# Patient Record
Sex: MALE | Race: WHITE | Employment: FULL TIME | URBAN - METROPOLITAN AREA
[De-identification: names, ages, dates, MRNs, and addresses within clinical notes are randomized per-mention and may not be internally consistent; named-entity substitution may affect disease eponyms.]

---

## 2020-09-02 ENCOUNTER — HOSPITAL ENCOUNTER (INPATIENT)
Age: 35
LOS: 1 days | Discharge: HOME | DRG: 683 | End: 2020-09-03
Attending: INTERNAL MEDICINE | Admitting: INTERNAL MEDICINE
Payer: COMMERCIAL

## 2020-09-02 ENCOUNTER — APPOINTMENT (OUTPATIENT)
Dept: CT IMAGING | Age: 35
DRG: 683 | End: 2020-09-02
Payer: COMMERCIAL

## 2020-09-02 ENCOUNTER — APPOINTMENT (OUTPATIENT)
Dept: GENERAL RADIOLOGY | Age: 35
DRG: 683 | End: 2020-09-02
Payer: COMMERCIAL

## 2020-09-02 PROBLEM — N17.9 AKI (ACUTE KIDNEY INJURY) (HCC): Status: ACTIVE | Noted: 2020-09-02

## 2020-09-02 LAB
ALBUMIN SERPL-MCNC: 5.5 GM/DL (ref 3.4–5)
ALCOHOL SCREEN SERUM: <0.01 %WT/VOL
ALP BLD-CCNC: 116 IU/L (ref 40–128)
ALT SERPL-CCNC: 29 U/L (ref 10–40)
AMPHETAMINES: ABNORMAL
ANION GAP SERPL CALCULATED.3IONS-SCNC: 25 MMOL/L (ref 4–16)
AST SERPL-CCNC: 42 IU/L (ref 15–37)
BACTERIA: NEGATIVE /HPF
BARBITURATE SCREEN URINE: NEGATIVE
BASOPHILS ABSOLUTE: 0.1 K/CU MM
BASOPHILS RELATIVE PERCENT: 0.4 % (ref 0–1)
BENZODIAZEPINE SCREEN, URINE: ABNORMAL
BILIRUB SERPL-MCNC: 1.5 MG/DL (ref 0–1)
BILIRUBIN URINE: ABNORMAL MG/DL
BLOOD, URINE: ABNORMAL
BUN BLDV-MCNC: 18 MG/DL (ref 6–23)
CALCIUM SERPL-MCNC: 11.2 MG/DL (ref 8.3–10.6)
CANNABINOID SCREEN URINE: NEGATIVE
CHLORIDE BLD-SCNC: 89 MMOL/L (ref 99–110)
CLARITY: ABNORMAL
CO2: 19 MMOL/L (ref 21–32)
COCAINE METABOLITE: NEGATIVE
COLOR: ABNORMAL
CREAT SERPL-MCNC: 2.5 MG/DL (ref 0.9–1.3)
DIFFERENTIAL TYPE: ABNORMAL
EOSINOPHILS ABSOLUTE: 0 K/CU MM
EOSINOPHILS RELATIVE PERCENT: 0.1 % (ref 0–3)
GFR AFRICAN AMERICAN: 36 ML/MIN/1.73M2
GFR NON-AFRICAN AMERICAN: 30 ML/MIN/1.73M2
GLUCOSE BLD-MCNC: 102 MG/DL (ref 70–99)
GLUCOSE, URINE: NEGATIVE MG/DL
HCT VFR BLD CALC: 51.3 % (ref 42–52)
HEMOGLOBIN: 17.8 GM/DL (ref 13.5–18)
HYALINE CASTS: >20 /LPF
ICTOTEST: NEGATIVE
IMMATURE NEUTROPHIL %: 0.4 % (ref 0–0.43)
KETONES, URINE: ABNORMAL MG/DL
LACTATE: 1.3 MMOL/L (ref 0.4–2)
LEUKOCYTE ESTERASE, URINE: ABNORMAL
LYMPHOCYTES ABSOLUTE: 1.4 K/CU MM
LYMPHOCYTES RELATIVE PERCENT: 8.4 % (ref 24–44)
MAGNESIUM: 2.1 MG/DL (ref 1.8–2.4)
MCH RBC QN AUTO: 32.7 PG (ref 27–31)
MCHC RBC AUTO-ENTMCNC: 34.7 % (ref 32–36)
MCV RBC AUTO: 94.3 FL (ref 78–100)
MONOCYTES ABSOLUTE: 1.4 K/CU MM
MONOCYTES RELATIVE PERCENT: 8.6 % (ref 0–4)
MUCUS: ABNORMAL HPF
NITRITE URINE, QUANTITATIVE: NEGATIVE
NUCLEATED RBC %: 0 %
OPIATES, URINE: NEGATIVE
OXYCODONE: NEGATIVE
PDW BLD-RTO: 13.8 % (ref 11.7–14.9)
PH, URINE: 5 (ref 5–8)
PHENCYCLIDINE, URINE: NEGATIVE
PLATELET # BLD: 254 K/CU MM (ref 140–440)
PMV BLD AUTO: 10.3 FL (ref 7.5–11.1)
POTASSIUM SERPL-SCNC: 4.5 MMOL/L (ref 3.5–5.1)
PROTEIN UA: 100 MG/DL
RBC # BLD: 5.44 M/CU MM (ref 4.6–6.2)
RBC URINE: 33 /HPF (ref 0–3)
SEGMENTED NEUTROPHILS ABSOLUTE COUNT: 13.4 K/CU MM
SEGMENTED NEUTROPHILS RELATIVE PERCENT: 82.1 % (ref 36–66)
SODIUM BLD-SCNC: 133 MMOL/L (ref 135–145)
SPECIFIC GRAVITY UA: 1.03 (ref 1–1.03)
TOTAL CK: 850 IU/L (ref 38–174)
TOTAL IMMATURE NEUTOROPHIL: 0.06 K/CU MM
TOTAL NUCLEATED RBC: 0 K/CU MM
TOTAL PROTEIN: 9.6 GM/DL (ref 6.4–8.2)
TRICHOMONAS: ABNORMAL /HPF
UROBILINOGEN, URINE: 1 MG/DL (ref 0.2–1)
WBC # BLD: 16.3 K/CU MM (ref 4–10.5)
WBC UA: 4 /HPF (ref 0–2)

## 2020-09-02 PROCEDURE — 6360000002 HC RX W HCPCS: Performed by: PHYSICIAN ASSISTANT

## 2020-09-02 PROCEDURE — 81001 URINALYSIS AUTO W/SCOPE: CPT

## 2020-09-02 PROCEDURE — G0480 DRUG TEST DEF 1-7 CLASSES: HCPCS

## 2020-09-02 PROCEDURE — G0378 HOSPITAL OBSERVATION PER HR: HCPCS

## 2020-09-02 PROCEDURE — 80307 DRUG TEST PRSMV CHEM ANLYZR: CPT

## 2020-09-02 PROCEDURE — 2580000003 HC RX 258: Performed by: PHYSICIAN ASSISTANT

## 2020-09-02 PROCEDURE — 83735 ASSAY OF MAGNESIUM: CPT

## 2020-09-02 PROCEDURE — 74176 CT ABD & PELVIS W/O CONTRAST: CPT

## 2020-09-02 PROCEDURE — 80053 COMPREHEN METABOLIC PANEL: CPT

## 2020-09-02 PROCEDURE — 96361 HYDRATE IV INFUSION ADD-ON: CPT

## 2020-09-02 PROCEDURE — 71046 X-RAY EXAM CHEST 2 VIEWS: CPT

## 2020-09-02 PROCEDURE — 96374 THER/PROPH/DIAG INJ IV PUSH: CPT

## 2020-09-02 PROCEDURE — 93005 ELECTROCARDIOGRAM TRACING: CPT | Performed by: EMERGENCY MEDICINE

## 2020-09-02 PROCEDURE — 85025 COMPLETE CBC W/AUTO DIFF WBC: CPT

## 2020-09-02 PROCEDURE — 99285 EMERGENCY DEPT VISIT HI MDM: CPT

## 2020-09-02 PROCEDURE — 83605 ASSAY OF LACTIC ACID: CPT

## 2020-09-02 PROCEDURE — 2580000003 HC RX 258: Performed by: EMERGENCY MEDICINE

## 2020-09-02 PROCEDURE — 1200000000 HC SEMI PRIVATE

## 2020-09-02 PROCEDURE — 36415 COLL VENOUS BLD VENIPUNCTURE: CPT

## 2020-09-02 PROCEDURE — 82550 ASSAY OF CK (CPK): CPT

## 2020-09-02 PROCEDURE — 96375 TX/PRO/DX INJ NEW DRUG ADDON: CPT

## 2020-09-02 RX ORDER — DIAZEPAM 5 MG/1
5 TABLET ORAL EVERY 12 HOURS PRN
Status: DISCONTINUED | OUTPATIENT
Start: 2020-09-02 | End: 2020-09-03 | Stop reason: HOSPADM

## 2020-09-02 RX ORDER — SODIUM CHLORIDE 0.9 % (FLUSH) 0.9 %
10 SYRINGE (ML) INJECTION PRN
Status: DISCONTINUED | OUTPATIENT
Start: 2020-09-02 | End: 2020-09-03 | Stop reason: HOSPADM

## 2020-09-02 RX ORDER — DEXTROAMPHETAMINE SACCHARATE, AMPHETAMINE ASPARTATE, DEXTROAMPHETAMINE SULFATE AND AMPHETAMINE SULFATE 7.5; 7.5; 7.5; 7.5 MG/1; MG/1; MG/1; MG/1
30 TABLET ORAL 2 TIMES DAILY
COMMUNITY

## 2020-09-02 RX ORDER — DIAZEPAM 10 MG/1
10 TABLET ORAL 2 TIMES DAILY PRN
COMMUNITY

## 2020-09-02 RX ORDER — SODIUM CHLORIDE, SODIUM LACTATE, POTASSIUM CHLORIDE, CALCIUM CHLORIDE 600; 310; 30; 20 MG/100ML; MG/100ML; MG/100ML; MG/100ML
INJECTION, SOLUTION INTRAVENOUS CONTINUOUS
Status: DISCONTINUED | OUTPATIENT
Start: 2020-09-02 | End: 2020-09-03 | Stop reason: HOSPADM

## 2020-09-02 RX ORDER — PROMETHAZINE HYDROCHLORIDE 25 MG/1
12.5 TABLET ORAL EVERY 6 HOURS PRN
Status: DISCONTINUED | OUTPATIENT
Start: 2020-09-02 | End: 2020-09-03 | Stop reason: HOSPADM

## 2020-09-02 RX ORDER — HEPARIN SODIUM 5000 [USP'U]/ML
5000 INJECTION, SOLUTION INTRAVENOUS; SUBCUTANEOUS EVERY 8 HOURS SCHEDULED
Status: DISCONTINUED | OUTPATIENT
Start: 2020-09-03 | End: 2020-09-03 | Stop reason: HOSPADM

## 2020-09-02 RX ORDER — MORPHINE SULFATE 4 MG/ML
4 INJECTION, SOLUTION INTRAMUSCULAR; INTRAVENOUS
Status: DISCONTINUED | OUTPATIENT
Start: 2020-09-02 | End: 2020-09-02

## 2020-09-02 RX ORDER — KETOROLAC TROMETHAMINE 30 MG/ML
30 INJECTION, SOLUTION INTRAMUSCULAR; INTRAVENOUS ONCE
Status: COMPLETED | OUTPATIENT
Start: 2020-09-02 | End: 2020-09-02

## 2020-09-02 RX ORDER — SODIUM CHLORIDE 0.9 % (FLUSH) 0.9 %
10 SYRINGE (ML) INJECTION EVERY 12 HOURS SCHEDULED
Status: DISCONTINUED | OUTPATIENT
Start: 2020-09-02 | End: 2020-09-03 | Stop reason: HOSPADM

## 2020-09-02 RX ORDER — ACETAMINOPHEN 650 MG/1
650 SUPPOSITORY RECTAL EVERY 6 HOURS PRN
Status: DISCONTINUED | OUTPATIENT
Start: 2020-09-02 | End: 2020-09-03 | Stop reason: HOSPADM

## 2020-09-02 RX ORDER — ONDANSETRON 2 MG/ML
4 INJECTION INTRAMUSCULAR; INTRAVENOUS ONCE
Status: COMPLETED | OUTPATIENT
Start: 2020-09-02 | End: 2020-09-02

## 2020-09-02 RX ORDER — ACETAMINOPHEN 325 MG/1
650 TABLET ORAL EVERY 6 HOURS PRN
Status: DISCONTINUED | OUTPATIENT
Start: 2020-09-02 | End: 2020-09-03 | Stop reason: HOSPADM

## 2020-09-02 RX ORDER — POLYETHYLENE GLYCOL 3350 17 G/17G
17 POWDER, FOR SOLUTION ORAL DAILY PRN
Status: DISCONTINUED | OUTPATIENT
Start: 2020-09-02 | End: 2020-09-03 | Stop reason: HOSPADM

## 2020-09-02 RX ORDER — ONDANSETRON 2 MG/ML
4 INJECTION INTRAMUSCULAR; INTRAVENOUS EVERY 6 HOURS PRN
Status: DISCONTINUED | OUTPATIENT
Start: 2020-09-02 | End: 2020-09-03 | Stop reason: HOSPADM

## 2020-09-02 RX ORDER — DEXTROAMPHETAMINE SACCHARATE, AMPHETAMINE ASPARTATE, DEXTROAMPHETAMINE SULFATE AND AMPHETAMINE SULFATE 7.5; 7.5; 7.5; 7.5 MG/1; MG/1; MG/1; MG/1
30 TABLET ORAL 2 TIMES DAILY
Status: DISCONTINUED | OUTPATIENT
Start: 2020-09-03 | End: 2020-09-03 | Stop reason: HOSPADM

## 2020-09-02 RX ORDER — 0.9 % SODIUM CHLORIDE 0.9 %
1000 INTRAVENOUS SOLUTION INTRAVENOUS ONCE
Status: COMPLETED | OUTPATIENT
Start: 2020-09-02 | End: 2020-09-02

## 2020-09-02 RX ADMIN — ONDANSETRON 4 MG: 2 INJECTION INTRAMUSCULAR; INTRAVENOUS at 19:46

## 2020-09-02 RX ADMIN — SODIUM CHLORIDE 1000 ML: 9 INJECTION, SOLUTION INTRAVENOUS at 19:21

## 2020-09-02 RX ADMIN — SODIUM CHLORIDE 1000 ML: 9 INJECTION, SOLUTION INTRAVENOUS at 21:31

## 2020-09-02 RX ADMIN — KETOROLAC TROMETHAMINE 30 MG: 30 INJECTION, SOLUTION INTRAMUSCULAR at 19:46

## 2020-09-02 ASSESSMENT — PAIN SCALES - GENERAL
PAINLEVEL_OUTOF10: 9
PAINLEVEL_OUTOF10: 9

## 2020-09-02 ASSESSMENT — PAIN DESCRIPTION - ORIENTATION: ORIENTATION: RIGHT

## 2020-09-02 ASSESSMENT — PAIN DESCRIPTION - PAIN TYPE: TYPE: ACUTE PAIN

## 2020-09-02 ASSESSMENT — PAIN DESCRIPTION - LOCATION: LOCATION: ABDOMEN

## 2020-09-02 NOTE — ED PROVIDER NOTES
Emergency [address] EMERGENCY DEPARTMENT    Patient: Enoch Gonzalez  MRN: [id number]  : 1985  Date of Evaluation: 2020  ED Provider: Beryle Piper, PA-C    Chief Complaint       Chief Complaint   Patient presents with    Abdominal Pain    Chest Pain    Shortness of Breath       VICTORINO Gonzalez is a 37 y.o. male who presents to the emergency department for abdominal pain, feeling generally unwell. Patient states he was off work for 4 weeks for [employer]. Patient states he initially had a sore throat and was seen at an Urgent Care. He tested positive for strep throat and had a negative COVID test, but was still required to quarantine for 2 weeks. He states when he was scheduled to return to work, his daughter developed a fever. He states the fever resolved within a day and he then took her back to [place] where her mother lives. When he returned, he was required to quarantine another 2 weeks due to travel to [place]. Patient returned to work yesterday. He states he felt generally unwell after work--mostly run down and had some nausea. 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 He states he started having generalized cramping pain throughout his body--in his calves, arms, fingers. He tried drinking water and Gatorade but the cramping persisted, so he was sent home. On his drive, he had to pull over and had a single episode of vomiting which intensified the abdominal pain. He tried taking a hot shower but that didn't help, so he called his PCP who recommended he come to the ED for evaluation. He denies any known sick contacts. Denies fever, cough, congestion. He does admit to being a daily drinker--usually 4-5 shots/liquor.         ROS     CONSTITUTIONAL:  Denies Alb 5.5 (H) 3.4 - 5.0 GM/DL    Total Protein 9.6 (H) 6.4 - 8.2 GM/DL    Total Bilirubin 1.5 (H) 0.0 - 1.0 MG/DL    ALT 29 10 - 40 U/L    AST 42 (H) 15 - 37 IU/L    Alkaline Phosphatase 116 40 - 128 IU/L    GFR Non-African American 30 (L) >60 mL/min/1.73m2    GFR  36 (L) >60 mL/min/1.73m2    Anion Gap 25 (H) 4 - 16   CK   Result Value Ref Range    Total  (H) 38 - 174 IU/L   Urinalysis with microscopic   Result Value Ref Range    Color, UA JEREMIAH (A) YELLOW    Clarity, UA SLIGHTLY CLOUDY (A) CLEAR    Glucose, Urine NEGATIVE NEGATIVE MG/DL    Bilirubin Urine MODERATE (A) NEGATIVE MG/DL    Ketones, Urine SMALL (A) NEGATIVE MG/DL    Specific Gravity, UA 1.030 1.001 - 1.035    Blood, Urine LARGE (A) NEGATIVE    pH, Urine 5.0 5.0 - 8.0    Protein,  (A) NEGATIVE MG/DL    Urobilinogen, Urine 1 0.2 - 1.0 MG/DL    Nitrite Urine, Quantitative NEGATIVE NEGATIVE    Leukocyte Esterase, Urine TRACE (A) NEGATIVE    RBC, UA 33 (H) 0 - 3 /HPF    WBC, UA 4 (H) 0 - 2 /HPF    Bacteria, UA NEGATIVE NEGATIVE /HPF    Mucus, UA RARE (A) NEGATIVE HPF    Trichomonas, UA NONE SEEN NONE SEEN /HPF    Hyaline Casts, UA >20 /LPF   Ethanol Level   Result Value Ref Range    Alcohol Scrn <0.01 <0.01 %WT/VOL   Magnesium   Result Value Ref Range    Magnesium 2.1 1.8 - 2.4 mg/dl   ICTOTEST, URINE   Result Value Ref Range    Ictotest NEGATIVE    Urine Drug Screen   Result Value Ref Range    Cannabinoid Scrn, Ur NEGATIVE NEGATIVE    Amphetamines UNCONFIRMED POSITIVE (A) NEGATIVE    Cocaine Metabolite NEGATIVE NEGATIVE    Benzodiazepine Screen, Urine UNCONFIRMED POSITIVE (A) NEGATIVE    Barbiturate Screen, Ur NEGATIVE NEGATIVE    Opiates, Urine NEGATIVE NEGATIVE    Phencyclidine, Urine NEGATIVE NEGATIVE    Oxycodone NEGATIVE NEGATIVE   Lactic Acid, Plasma   Result Value Ref Range    Lactate 1.3 0.4 - 2.0 mMOL/L       Radiographs:  Ct Abdomen Pelvis Wo Contrast Additional Contrast? None    Result Date: 9/2/2020  EXAMINATION: CT OF THE ABDOMEN AND PELVIS WITHOUT CONTRAST 9/2/2020 8:02 pm TECHNIQUE: CT of the abdomen and pelvis was performed without the administration of intravenous contrast. Multiplanar reformatted images are provided for review. Dose modulation, iterative reconstruction, and/or weight based adjustment of the mA/kV was utilized to reduce the radiation dose to as low as reasonably achievable. COMPARISON: None. HISTORY: ORDERING SYSTEM PROVIDED HISTORY: cramping pain TECHNOLOGIST PROVIDED HISTORY: Reason for exam:->cramping pain Additional Contrast?->None Reason for Exam: abd pain Acuity: Acute Type of Exam: Initial Additional signs and symptoms: Gumaro Conley is a 37 y.o. male Odilia Dine to the emergency department stating he has pain and cramping throughout his abdomen. States he was out of work for 4 weeks for [employer]. Tested negative for Covid but states he was not doing much activity. Recently went back to work and states exerting himself. States he has had a lot of muscle cramps and dark urination. Relevant Medical/Surgical History: [id number] FINDINGS: Lower Chest:  Visualized portion of the lower chest demonstrates no acute abnormality. Organs: The liver demonstrates diffuse hypoattenuation compatible hepatic steatosis. Small hypodensity of the right hepatic lobe medially measuring 2 cm (image 38 series 2; image 39 series 3; image 105 series 4). This demonstrates Hounsfield units suggestive of a cyst.  Gallbladder demonstrates no acute CT abnormality. No biliary dilatation. Nonenhanced spleen, pancreas, and bilateral adrenal glands demonstrate no acute abnormality. The nonenhanced kidneys demonstrate bilateral nonobstructing 3 mm nephrolithiasis. No obstructive uropathy identified. No ureteral stone or hydronephrosis. GI/Bowel: No bowel obstruction identified. No abnormal bowel wall thickening is evident. Small bowel is normal in caliber. Pelvis: Bladder and solid pelvic organs demonstrate no acute findings. The bladder is collapsed therefore not well evaluated. Peritoneum/Retroperitoneum: The abdominal aorta is normal in caliber with no evidence for retroperitoneal adenopathy, free fluid, or free air within the abdomen and pelvis. Bones/Soft Tissues: No acute osseous or soft tissue abnormality identified. Postoperative changes of the lumbosacral spine. 1. No acute intra-process identified. 2. Nonobstructing bilateral nephrolithiasis. Hepatic steatosis. Xr Chest (2 Vw)    Result Date: 9/2/2020  EXAMINATION: TWO XRAY VIEWS OF THE CHEST 9/2/2020 6:29 pm COMPARISON: None. HISTORY: ORDERING SYSTEM PROVIDED HISTORY: Chest pain TECHNOLOGIST PROVIDED HISTORY: Reason for exam:->Chest pain Reason for Exam: Chest pain Acuity: Acute Type of Exam: Initial Additional signs and symptoms: na Relevant Medical/Surgical History: na FINDINGS: No infiltrate or consolidation or effusion is identified. The heart size is normal.     No acute abnormality visualized. Procedures/EKG:   Please see Dr. Elizabeth Hicks note for interpretation. ED Course and MDM   -  Patient seen and evaluated in the emergency department. -  Triage and nursing notes reviewed and incorporated. -  Old chart records reviewed and incorporated. -  Supervising physician was Dr. Cassidy Perez. Patient was seen independently. -  Work-up included:  See above  -  ED medications:  NS, Toradol, Zofran  -  Results discussed with patient. Patient has a leukocytosis of 16,300. ARF with Cr 2.5, GFR 30. Total bili is 1.5. Anion gap is 25. . UDS is positive for amphetamines and benzos, he is prescribed Adderrall and Valium per PDMP. I spoke with Dr. Kushal Sommer, hospitalist, who will admit for further management. In light of current events, I did utilize appropriate PPE (including surgical face mask, safety glasses, and gloves, as recommended by the health facility/national standard best practice, during my bedside interactions with the patient.   Patient was also masked throughout ED course. Final Impression      1. Acute renal failure, unspecified acute renal failure type ([facility] [address]    2. Non-traumatic rhabdomyolysis    3. Elevated bilirubin    4. Positive urine drug screen    5.  Leukocytosis, unspecified type          DISPOSITION Admitted 09/02/2020 10:33:17 PM      [facility], [address] [place] [address] Ambrosio Verta Fritter  09/02/20 4333

## 2020-09-03 VITALS
TEMPERATURE: 97.9 F | OXYGEN SATURATION: 97 % | DIASTOLIC BLOOD PRESSURE: 66 MMHG | SYSTOLIC BLOOD PRESSURE: 131 MMHG | HEART RATE: 91 BPM | HEIGHT: 75 IN | RESPIRATION RATE: 18 BRPM | WEIGHT: 193 LBS | BODY MASS INDEX: 24 KG/M2

## 2020-09-03 LAB
ALBUMIN SERPL-MCNC: 4.3 GM/DL (ref 3.4–5)
ANION GAP SERPL CALCULATED.3IONS-SCNC: 12 MMOL/L (ref 4–16)
ANION GAP SERPL CALCULATED.3IONS-SCNC: 13 MMOL/L (ref 4–16)
BASOPHILS ABSOLUTE: 0 K/CU MM
BASOPHILS RELATIVE PERCENT: 0.3 % (ref 0–1)
BUN BLDV-MCNC: 13 MG/DL (ref 6–23)
BUN BLDV-MCNC: 20 MG/DL (ref 6–23)
CALCIUM SERPL-MCNC: 9.4 MG/DL (ref 8.3–10.6)
CALCIUM SERPL-MCNC: 9.6 MG/DL (ref 8.3–10.6)
CHLORIDE BLD-SCNC: 94 MMOL/L (ref 99–110)
CHLORIDE BLD-SCNC: 94 MMOL/L (ref 99–110)
CO2: 24 MMOL/L (ref 21–32)
CO2: 27 MMOL/L (ref 21–32)
CREAT SERPL-MCNC: 1 MG/DL (ref 0.9–1.3)
CREAT SERPL-MCNC: 1.7 MG/DL (ref 0.9–1.3)
DIFFERENTIAL TYPE: ABNORMAL
EOSINOPHILS ABSOLUTE: 0.1 K/CU MM
EOSINOPHILS RELATIVE PERCENT: 1.3 % (ref 0–3)
GFR AFRICAN AMERICAN: 56 ML/MIN/1.73M2
GFR AFRICAN AMERICAN: >60 ML/MIN/1.73M2
GFR NON-AFRICAN AMERICAN: 47 ML/MIN/1.73M2
GFR NON-AFRICAN AMERICAN: >60 ML/MIN/1.73M2
GLUCOSE BLD-MCNC: 114 MG/DL (ref 70–99)
GLUCOSE BLD-MCNC: 99 MG/DL (ref 70–99)
HCT VFR BLD CALC: 44.8 % (ref 42–52)
HEMOGLOBIN: 15.2 GM/DL (ref 13.5–18)
IMMATURE NEUTROPHIL %: 0.3 % (ref 0–0.43)
LYMPHOCYTES ABSOLUTE: 1.8 K/CU MM
LYMPHOCYTES RELATIVE PERCENT: 17.5 % (ref 24–44)
MCH RBC QN AUTO: 32.7 PG (ref 27–31)
MCHC RBC AUTO-ENTMCNC: 33.9 % (ref 32–36)
MCV RBC AUTO: 96.3 FL (ref 78–100)
MONOCYTES ABSOLUTE: 1.5 K/CU MM
MONOCYTES RELATIVE PERCENT: 14.2 % (ref 0–4)
NUCLEATED RBC %: 0 %
PDW BLD-RTO: 13.9 % (ref 11.7–14.9)
PHOSPHORUS: 3.4 MG/DL (ref 2.5–4.9)
PLATELET # BLD: 213 K/CU MM (ref 140–440)
PMV BLD AUTO: 10.3 FL (ref 7.5–11.1)
POTASSIUM SERPL-SCNC: 4 MMOL/L (ref 3.5–5.1)
POTASSIUM SERPL-SCNC: 4.2 MMOL/L (ref 3.5–5.1)
RBC # BLD: 4.65 M/CU MM (ref 4.6–6.2)
SEGMENTED NEUTROPHILS ABSOLUTE COUNT: 6.9 K/CU MM
SEGMENTED NEUTROPHILS RELATIVE PERCENT: 66.4 % (ref 36–66)
SODIUM BLD-SCNC: 131 MMOL/L (ref 135–145)
SODIUM BLD-SCNC: 133 MMOL/L (ref 135–145)
TOTAL CK: 717 IU/L (ref 38–174)
TOTAL IMMATURE NEUTOROPHIL: 0.03 K/CU MM
TOTAL NUCLEATED RBC: 0 K/CU MM
WBC # BLD: 10.3 K/CU MM (ref 4–10.5)

## 2020-09-03 PROCEDURE — 6360000002 HC RX W HCPCS: Performed by: INTERNAL MEDICINE

## 2020-09-03 PROCEDURE — 6370000000 HC RX 637 (ALT 250 FOR IP): Performed by: INTERNAL MEDICINE

## 2020-09-03 PROCEDURE — 82550 ASSAY OF CK (CPK): CPT

## 2020-09-03 PROCEDURE — 6360000002 HC RX W HCPCS: Performed by: HOSPITALIST

## 2020-09-03 PROCEDURE — 85025 COMPLETE CBC W/AUTO DIFF WBC: CPT

## 2020-09-03 PROCEDURE — 80069 RENAL FUNCTION PANEL: CPT

## 2020-09-03 PROCEDURE — G0378 HOSPITAL OBSERVATION PER HR: HCPCS

## 2020-09-03 PROCEDURE — 36415 COLL VENOUS BLD VENIPUNCTURE: CPT

## 2020-09-03 PROCEDURE — 96375 TX/PRO/DX INJ NEW DRUG ADDON: CPT

## 2020-09-03 PROCEDURE — 80048 BASIC METABOLIC PNL TOTAL CA: CPT

## 2020-09-03 PROCEDURE — 2580000003 HC RX 258: Performed by: INTERNAL MEDICINE

## 2020-09-03 PROCEDURE — 94761 N-INVAS EAR/PLS OXIMETRY MLT: CPT

## 2020-09-03 PROCEDURE — 93010 ELECTROCARDIOGRAM REPORT: CPT | Performed by: INTERNAL MEDICINE

## 2020-09-03 RX ADMIN — DIAZEPAM 5 MG: 5 TABLET ORAL at 01:31

## 2020-09-03 RX ADMIN — HYDROMORPHONE HYDROCHLORIDE 0.5 MG: 1 INJECTION, SOLUTION INTRAMUSCULAR; INTRAVENOUS; SUBCUTANEOUS at 00:13

## 2020-09-03 RX ADMIN — HYDROMORPHONE HYDROCHLORIDE 0.5 MG: 1 INJECTION, SOLUTION INTRAMUSCULAR; INTRAVENOUS; SUBCUTANEOUS at 09:28

## 2020-09-03 RX ADMIN — SODIUM CHLORIDE, PRESERVATIVE FREE 10 ML: 5 INJECTION INTRAVENOUS at 00:16

## 2020-09-03 RX ADMIN — DEXTROAMPHETAMINE SACCHARATE, AMPHETAMINE ASPARTATE, DEXTROAMPHETAMINE SULFATE AND AMPHETAMINE SULFATE 30 MG: 7.5; 7.5; 7.5; 7.5 TABLET ORAL at 09:56

## 2020-09-03 RX ADMIN — SODIUM CHLORIDE, POTASSIUM CHLORIDE, SODIUM LACTATE AND CALCIUM CHLORIDE: 600; 310; 30; 20 INJECTION, SOLUTION INTRAVENOUS at 05:08

## 2020-09-03 RX ADMIN — SODIUM CHLORIDE, POTASSIUM CHLORIDE, SODIUM LACTATE AND CALCIUM CHLORIDE: 600; 310; 30; 20 INJECTION, SOLUTION INTRAVENOUS at 00:12

## 2020-09-03 RX ADMIN — SODIUM CHLORIDE, POTASSIUM CHLORIDE, SODIUM LACTATE AND CALCIUM CHLORIDE: 600; 310; 30; 20 INJECTION, SOLUTION INTRAVENOUS at 10:43

## 2020-09-03 RX ADMIN — SODIUM CHLORIDE, PRESERVATIVE FREE 10 ML: 5 INJECTION INTRAVENOUS at 09:28

## 2020-09-03 ASSESSMENT — PAIN SCALES - GENERAL
PAINLEVEL_OUTOF10: 7
PAINLEVEL_OUTOF10: 8

## 2020-09-03 NOTE — PLAN OF CARE
Problem: Pain:  Goal: Pain level will decrease  Description: Pain level will decrease  Outcome: Ongoing  Goal: Control of acute pain  Description: Control of acute pain  Outcome: Ongoing  Goal: Control of chronic pain  Description: Control of chronic pain  Outcome: Ongoing     Problem: Discharge Planning:  Goal: Discharged to appropriate level of care  Description: Discharged to appropriate level of care  Outcome: Ongoing

## 2020-09-03 NOTE — ED NOTES
Report called to [facility], RN for patient going to room 1114. All questions answered.       Abhi Bowens RN  09/02/20 2333

## 2020-09-03 NOTE — ED PROVIDER NOTES
EKG Interpretation    EKG performed on 9/2/2020 at 1801    Interpreted by emergency department physician    Rhythm: sinus tachycardia  Rate: tachycardia and 120-130  Axis: normal  Ectopy: none  Conduction: normal  ST Segments: no acute change  T Waves: no acute change  Q Waves: none    Clinical Impression: Sinus tachycardia. Left ventricular hypertrophy.     Samy Baumann 27, DO  09/02/20 8815

## 2020-09-03 NOTE — DISCHARGE SUMMARY
Discharge Summary    Name:  Joon Reyes /Age/Sex: 1985  (37 y.o. male)   MRN & CSN:  [id number] & [id number] Admission Date/Time: 2020  6:47 PM   Attending:  Hadley Lechuga MD Discharging Physician: Delma Carcamo MD     HPI and Hospital Course:   Joon Reyes is a 37 y.o.  male  who presents with Abdominal Pain; Chest Pain; and Shortness of Breath    HPI- as per H and [address]  1-Muscle cramps reported after exertion at work- reports improvement- less pain today, CK trended down with IVF  2-JORGE with hyponatremia- likely due to hypovolemia- creatinine improved from 2.5 to almost normal this afternoon, had been on IVF  -hypercalcemia improved as well. He called me to the room, wants to be out of the hospital regardless. Given that dehydration signs significantly improved, discharging and recommended continued self-hydration and outpatient follow up with PCP. No prescription given      The patient expressed appropriate understanding of and agreement with the discharge recommendations, medications, and plan. Consults this admission:  IP CONSULT TO HOSPITALIST  IP CONSULT TO PHARMACY    Discharge Instruction:   Follow up appointments:   Primary care physician:  within 2 weeks    Diet:  General/cardiac/ADA/as tolerated  Activity: {discharge activity: as tolerated  Disposition: Discharged to:   [x]Home, []C, []SNF, []Acute Rehab, []Hospice   Condition on discharge: Stable    Discharge Medications:      Refugia Pale   Home Medication Instructions [id number]    Printed on:20 [id number]   Medication Information                      amphetamine-dextroamphetamine (ADDERALL) 30 MG tablet  Take 30 mg by mouth 2 times daily. diazePAM (VALIUM) 10 MG tablet  Take 10 mg by mouth every 12 hours as needed for Anxiety.                  Objective Findings at Discharge:   /66   Pulse 91   Temp 97.9 °F (36.6 °C) (Oral)   Resp 18   Ht 6' 3\" (1.905 m)   Wt 193 lb (87.5 kg)   SpO2 97%   BMI 24.12 kg/m²            PHYSICAL EXAM   GEN Awake male, laying in bed in no apparent distress. Appears given age. EYES Pupils are equally round. No scleral discharge  HENT Atraumatic and symmetric head  NECK No apparent thyromegaly  RESP Symmetric chest movement while on room air. CARDIO/VASC Peripheral pulses equal bilaterally and palpable. No peripheral edema. GI Abdomen is not distended. Rectal exam deferred.  Martines catheter is not present. HEME/LYMPH No petechiae or ecchymoses. MSK Spontaneous movement of BL upper extremities  SKIN Normal coloration, warm, dry. NEURO Cranial nerves appear grossly intact  PSYCH Awake, alert.     BMP/CBC  Recent Labs     09/02/20  1855 09/03/20  0228 09/03/20  1202   * 133* 131*   K 4.5 4.2 4.0   CL 89* 94* 94*   CO2 19* 27 24   BUN 18 20 13   CREATININE 2.5* 1.7* 1.0   WBC 16.3* 10.3  --    HCT 51.3 44.8  --     213  --      SIGNIFICANT IMAGING AND LABS:      Discharge Time of 22 minutes    Electronically signed by Andrei Downing MD on 9/3/2020 at 1:49 PM

## 2020-09-03 NOTE — CONSULTS
Pharmacy was asked to reconcile and update the prescriptions that he receives from his local [facility] pharmacy [phone]  Apparently, he's from [place] and the only 2 rx's that [facility]  here in town has filled are -   Adderal 30mg  tabs BID  And Diazepam 10mg BID  Thanks.   Milon Form PharmD  9/3/2020  10:04 AM
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
## 2020-09-03 NOTE — PROGRESS NOTES
Came in from ED with chief complaints of abdominal pain and SOB. alert and oriented x 4. Ambulatory. No SOB noted at this time. No skin issues noted. Vital signs stable. Rated abdominal pain at 8/10 at this time.

## 2020-09-09 LAB
EKG ATRIAL RATE: 121 BPM
EKG DIAGNOSIS: NORMAL
EKG P AXIS: 67 DEGREES
EKG P-R INTERVAL: 112 MS
EKG Q-T INTERVAL: 280 MS
EKG QRS DURATION: 74 MS
EKG QTC CALCULATION (BAZETT): 397 MS
EKG R AXIS: 61 DEGREES
EKG T AXIS: 66 DEGREES
EKG VENTRICULAR RATE: 121 BPM

## 2020-09-10 ENCOUNTER — APPOINTMENT (OUTPATIENT)
Dept: CT IMAGING | Age: 35
End: 2020-09-10
Payer: COMMERCIAL

## 2020-09-10 ENCOUNTER — HOSPITAL ENCOUNTER (EMERGENCY)
Age: 35
Discharge: HOME | End: 2020-09-10
Attending: EMERGENCY MEDICINE
Payer: COMMERCIAL

## 2020-09-10 VITALS
TEMPERATURE: 98 F | HEART RATE: 97 BPM | SYSTOLIC BLOOD PRESSURE: 151 MMHG | OXYGEN SATURATION: 98 % | DIASTOLIC BLOOD PRESSURE: 61 MMHG | RESPIRATION RATE: 16 BRPM

## 2020-09-10 LAB
ALBUMIN SERPL-MCNC: 4.8 GM/DL (ref 3.4–5)
ALP BLD-CCNC: 90 IU/L (ref 40–129)
ALT SERPL-CCNC: 31 U/L (ref 10–40)
ANION GAP SERPL CALCULATED.3IONS-SCNC: 17 MMOL/L (ref 4–16)
AST SERPL-CCNC: 28 IU/L (ref 15–37)
BACTERIA: NEGATIVE /HPF
BASOPHILS ABSOLUTE: 0.1 K/CU MM
BASOPHILS RELATIVE PERCENT: 0.9 % (ref 0–1)
BILIRUB SERPL-MCNC: 0.8 MG/DL (ref 0–1)
BILIRUBIN URINE: NEGATIVE MG/DL
BLOOD, URINE: NEGATIVE
BUN BLDV-MCNC: 9 MG/DL (ref 6–23)
CALCIUM SERPL-MCNC: 9.4 MG/DL (ref 8.3–10.6)
CHLORIDE BLD-SCNC: 102 MMOL/L (ref 99–110)
CLARITY: CLEAR
CO2: 21 MMOL/L (ref 21–32)
COLOR: YELLOW
CREAT SERPL-MCNC: 0.9 MG/DL (ref 0.9–1.3)
D DIMER: <200 NG/ML(DDU)
DIFFERENTIAL TYPE: ABNORMAL
EOSINOPHILS ABSOLUTE: 0.1 K/CU MM
EOSINOPHILS RELATIVE PERCENT: 1.1 % (ref 0–3)
GFR AFRICAN AMERICAN: >60 ML/MIN/1.73M2
GFR NON-AFRICAN AMERICAN: >60 ML/MIN/1.73M2
GLUCOSE BLD-MCNC: 88 MG/DL (ref 70–99)
GLUCOSE, URINE: NEGATIVE MG/DL
HCT VFR BLD CALC: 48.7 % (ref 42–52)
HEMOGLOBIN: 16.5 GM/DL (ref 13.5–18)
IMMATURE NEUTROPHIL %: 0.9 % (ref 0–0.43)
KETONES, URINE: ABNORMAL MG/DL
LEUKOCYTE ESTERASE, URINE: ABNORMAL
LIPASE: 25 IU/L (ref 13–60)
LYMPHOCYTES ABSOLUTE: 2.3 K/CU MM
LYMPHOCYTES RELATIVE PERCENT: 30.6 % (ref 24–44)
MCH RBC QN AUTO: 33.3 PG (ref 27–31)
MCHC RBC AUTO-ENTMCNC: 33.9 % (ref 32–36)
MCV RBC AUTO: 98.2 FL (ref 78–100)
MONOCYTES ABSOLUTE: 1 K/CU MM
MONOCYTES RELATIVE PERCENT: 12.9 % (ref 0–4)
MUCUS: ABNORMAL HPF
NITRITE URINE, QUANTITATIVE: NEGATIVE
NUCLEATED RBC %: 0 %
PDW BLD-RTO: 13.3 % (ref 11.7–14.9)
PH, URINE: 5 (ref 5–8)
PLATELET # BLD: 257 K/CU MM (ref 140–440)
PMV BLD AUTO: 10.4 FL (ref 7.5–11.1)
POTASSIUM SERPL-SCNC: 3.5 MMOL/L (ref 3.5–5.1)
PROTEIN UA: NEGATIVE MG/DL
RBC # BLD: 4.96 M/CU MM (ref 4.6–6.2)
RBC URINE: 2 /HPF (ref 0–3)
SEGMENTED NEUTROPHILS ABSOLUTE COUNT: 4 K/CU MM
SEGMENTED NEUTROPHILS RELATIVE PERCENT: 53.6 % (ref 36–66)
SODIUM BLD-SCNC: 140 MMOL/L (ref 135–145)
SPECIFIC GRAVITY UA: 1.02 (ref 1–1.03)
SQUAMOUS EPITHELIAL: <1 /HPF
TOTAL CK: 169 IU/L (ref 38–174)
TOTAL IMMATURE NEUTOROPHIL: 0.07 K/CU MM
TOTAL NUCLEATED RBC: 0 K/CU MM
TOTAL PROTEIN: 8.1 GM/DL (ref 6.4–8.2)
TRICHOMONAS: ABNORMAL /HPF
UROBILINOGEN, URINE: NORMAL MG/DL (ref 0.2–1)
WBC # BLD: 7.5 K/CU MM (ref 4–10.5)
WBC UA: 4 /HPF (ref 0–2)

## 2020-09-10 PROCEDURE — 85025 COMPLETE CBC W/AUTO DIFF WBC: CPT

## 2020-09-10 PROCEDURE — 83690 ASSAY OF LIPASE: CPT

## 2020-09-10 PROCEDURE — 96374 THER/PROPH/DIAG INJ IV PUSH: CPT

## 2020-09-10 PROCEDURE — 74177 CT ABD & PELVIS W/CONTRAST: CPT

## 2020-09-10 PROCEDURE — 81001 URINALYSIS AUTO W/SCOPE: CPT

## 2020-09-10 PROCEDURE — 99284 EMERGENCY DEPT VISIT MOD MDM: CPT

## 2020-09-10 PROCEDURE — 82550 ASSAY OF CK (CPK): CPT

## 2020-09-10 PROCEDURE — 2580000003 HC RX 258: Performed by: PHYSICIAN ASSISTANT

## 2020-09-10 PROCEDURE — 36415 COLL VENOUS BLD VENIPUNCTURE: CPT

## 2020-09-10 PROCEDURE — 6360000002 HC RX W HCPCS: Performed by: PHYSICIAN ASSISTANT

## 2020-09-10 PROCEDURE — 85379 FIBRIN DEGRADATION QUANT: CPT

## 2020-09-10 PROCEDURE — 6370000000 HC RX 637 (ALT 250 FOR IP): Performed by: PHYSICIAN ASSISTANT

## 2020-09-10 PROCEDURE — 6360000004 HC RX CONTRAST MEDICATION: Performed by: PHYSICIAN ASSISTANT

## 2020-09-10 PROCEDURE — 80053 COMPREHEN METABOLIC PANEL: CPT

## 2020-09-10 PROCEDURE — 87086 URINE CULTURE/COLONY COUNT: CPT

## 2020-09-10 RX ORDER — ONDANSETRON 2 MG/ML
4 INJECTION INTRAMUSCULAR; INTRAVENOUS ONCE
Status: COMPLETED | OUTPATIENT
Start: 2020-09-10 | End: 2020-09-10

## 2020-09-10 RX ORDER — ACETAMINOPHEN 500 MG
1000 TABLET ORAL ONCE
Status: COMPLETED | OUTPATIENT
Start: 2020-09-10 | End: 2020-09-10

## 2020-09-10 RX ORDER — 0.9 % SODIUM CHLORIDE 0.9 %
1000 INTRAVENOUS SOLUTION INTRAVENOUS ONCE
Status: COMPLETED | OUTPATIENT
Start: 2020-09-10 | End: 2020-09-10

## 2020-09-10 RX ADMIN — ACETAMINOPHEN 1000 MG: 500 TABLET ORAL at 13:03

## 2020-09-10 RX ADMIN — SODIUM CHLORIDE 1000 ML: 9 INJECTION, SOLUTION INTRAVENOUS at 11:26

## 2020-09-10 RX ADMIN — IOPAMIDOL 80 ML: 755 INJECTION, SOLUTION INTRAVENOUS at 14:04

## 2020-09-10 RX ADMIN — ONDANSETRON HYDROCHLORIDE 4 MG: 2 INJECTION INTRAMUSCULAR; INTRAVENOUS at 11:26

## 2020-09-10 ASSESSMENT — PAIN DESCRIPTION - PAIN TYPE: TYPE: ACUTE PAIN

## 2020-09-10 ASSESSMENT — PAIN SCALES - GENERAL
PAINLEVEL_OUTOF10: 10
PAINLEVEL_OUTOF10: 9

## 2020-09-10 ASSESSMENT — PAIN DESCRIPTION - LOCATION: LOCATION: FLANK

## 2020-09-10 ASSESSMENT — PAIN DESCRIPTION - ORIENTATION: ORIENTATION: RIGHT

## 2020-09-10 NOTE — ED PROVIDER NOTES
sounds, No wheezing  Abdomen: Bowel sounds normal, Soft, mild generalized tenderness, no masses. Mild right-sided CVA tenderness. Musculoskeletal: No edema, No cyanosis  Integument:  Warm, Dry  Neurologic:  Alert & oriented , No focal deficits noted. Normal gross motor coordination & motor strength bilateral upper and lower extremities, Sensation intact.   Psychiatric:  Affect normal, Mood normal.       Labs:  Results for orders placed or performed during the hospital encounter of 09/10/20   CBC Auto Differential   Result Value Ref Range    WBC 7.5 4.0 - 10.5 K/CU MM    RBC 4.96 4.6 - 6.2 M/CU MM    Hemoglobin 16.5 13.5 - 18.0 GM/DL    Hematocrit 48.7 42 - 52 %    MCV 98.2 78 - 100 FL    MCH 33.3 (H) 27 - 31 PG    MCHC 33.9 32.0 - 36.0 %    RDW 13.3 11.7 - 14.9 %    Platelets 808 541 - 941 K/CU MM    MPV 10.4 7.5 - 11.1 FL    Differential Type AUTOMATED DIFFERENTIAL     Segs Relative 53.6 36 - 66 %    Lymphocytes % 30.6 24 - 44 %    Monocytes % 12.9 (H) 0 - 4 %    Eosinophils % 1.1 0 - 3 %    Basophils % 0.9 0 - 1 %    Segs Absolute 4.0 K/CU MM    Lymphocytes Absolute 2.3 K/CU MM    Monocytes Absolute 1.0 K/CU MM    Eosinophils Absolute 0.1 K/CU MM    Basophils Absolute 0.1 K/CU MM    Nucleated RBC % 0.0 %    Total Nucleated RBC 0.0 K/CU MM    Total Immature Neutrophil 0.07 K/CU MM    Immature Neutrophil % 0.9 (H) 0 - 0.43 %   CMP   Result Value Ref Range    Sodium 140 135 - 145 MMOL/L    Potassium 3.5 3.5 - 5.1 MMOL/L    Chloride 102 99 - 110 mMol/L    CO2 21 21 - 32 MMOL/L    BUN 9 6 - 23 MG/DL    CREATININE 0.9 0.9 - 1.3 MG/DL    Glucose 88 70 - 99 MG/DL    Calcium 9.4 8.3 - 10.6 MG/DL    Alb 4.8 3.4 - 5.0 GM/DL    Total Protein 8.1 6.4 - 8.2 GM/DL    Total Bilirubin 0.8 0.0 - 1.0 MG/DL    ALT 31 10 - 40 U/L    AST 28 15 - 37 IU/L    Alkaline Phosphatase 90 40 - 129 IU/L    GFR Non-African American >60 >60 mL/min/1.73m2    GFR African American >60 >60 mL/min/1.73m2    Anion Gap 17 (H) 4 - 16   CK   Result Value Ref Range    Total  38 - 174 IU/L   Urinalysis   Result Value Ref Range    Color, UA YELLOW YELLOW    Clarity, UA CLEAR CLEAR    Glucose, Urine NEGATIVE NEGATIVE MG/DL    Bilirubin Urine NEGATIVE NEGATIVE MG/DL    Ketones, Urine SMALL (A) NEGATIVE MG/DL    Specific Gravity, UA 1.019 1.001 - 1.035    Blood, Urine NEGATIVE NEGATIVE    pH, Urine 5.0 5.0 - 8.0    Protein, UA NEGATIVE NEGATIVE MG/DL    Urobilinogen, Urine NORMAL 0.2 - 1.0 MG/DL    Nitrite Urine, Quantitative NEGATIVE NEGATIVE    Leukocyte Esterase, Urine TRACE (A) NEGATIVE    RBC, UA 2 0 - 3 /HPF    WBC, UA 4 (H) 0 - 2 /HPF    Bacteria, UA NEGATIVE NEGATIVE /HPF    Squam Epithel, UA <1 /HPF    Mucus, UA FEW (A) NEGATIVE HPF    Trichomonas, UA NONE SEEN NONE SEEN /HPF   Lipase   Result Value Ref Range    Lipase 25 13 - 60 IU/L   D-dimer, Quantitative   Result Value Ref Range    D-Dimer, Quant <200 <230 NG/mL(DDU)         ED COURSE & MEDICAL DECISION MAKING      Patient presents as above. Patient provided IV fluids, Zofran. Patient provided Tylenol for pain. Urinalysis shows small ketones, trace leukocytes, 4 white blood cells, negative bacteria. Patient denies any urinary symptoms. Urine sent for culture. CBC is within normal limits. CMP shows elevation anion gap of 17 otherwise within normal limits. CK is 169. Lipase 25. D-dimer is less than 200. Patient denies any chest pain, no increased work of breathing on exam and patient's pulse ox is 100%. Patient denies cough. I discussed lab work with patient today. Patient states he does not feel normal, with his main focus being his abdominal pain CT imaging is ordered to further evaluate. CT is pending at end of shift, see supervising physician note for final impression and plan. Clinical  IMPRESSION    1. Pain of upper abdomen        See supervising physician note for final impression and plan.     Comment: Please note this report has been produced using speech recognition software and may contain errors related to that system including errors in grammar, punctuation, and spelling, as well as words and phrases that may be inappropriate. If there are any questions or concerns please feel free to contact the dictating provider for clarification.             Fany Smalls PA-C  09/10/20 1311

## 2020-09-10 NOTE — LETTER
[address] [facility] Emergency Department  [address] [place] [address]  Phone: [phone]  Fax: [phone]             September 10, 2020    Patient: Gumaro Conley   YOB: 1985   Date of Visit: 9/10/2020       To Whom It May Concern:    Gumaro Conley was seen and treated in our emergency department on 9/10/2020. He may return to work on 9/14/2020.       Sincerely,             Signature:__________________________________

## 2020-09-10 NOTE — ED PROVIDER NOTES
EMERGENCY DEPARTMENT ENCOUNTER      I evaluated this patient via a TeleHealth platform as a Physician-in-triage. I performed a medical screening evaluation on the patient remotely via the [employer]. HISTORY OF PRESENT ILLNESS  Enoch Gonzalez is a 37 y.o. male here with flank pain and body cramps. The patient was recently admitted for rhabdomyolysis and acute kidney injury. He left prior to completing treatment and is having the same symptoms as when he was admitted. PHYSICAL EXAM  /71   Pulse 109   Temp 98 °F (36.7 °C) (Oral)   Resp 16   SpO2 98%     On exam, the patient appears well-hydrated, well-nourished, and in no acute distress. Mucous membranes are moist. Speech is clear. Breathing is unlabored. Skin is dry. Mental status is normal. Moves all extremities, and is without facial droop. Comment: Please note this report has been produced using speech recognition software and may contain errors related to that system including errors in grammar, punctuation, and spelling, as well as words and phrases that may be inappropriate. If there are any questions or concerns please feel free to contact the dictating provider for clarification.         Chaparro Rene MD  09/10/20 4117

## 2020-09-10 NOTE — ACP (ADVANCE CARE PLANNING)
Advance Care Planning     Advance Care Planning Activator (Inpatient)  Conversation Note      Date of ACP Conversation: 9/10/2020    Conversation Conducted with: Patient with Decision Making Capacity    ACP Activator: Ravi Villareal    *When Decision Maker makes decisions on behalf of the incapacitated patient: Decision Maker is asked to consider and make decisions based on patient values, known preferences, or best interests. Health Care Decision Maker:     Current Designated Health Care Decision Maker:   Primary Decision Maker: Osito Lozano [facility] - [phone]  (If there is a [address] named in the \"Healthcare Decision Makers\" box in the ACP activity, but it is not visible above, be sure to open that field and then select the health care decision maker relationship (ie \"primary\") in the blank space to the right of the name.) Validate  this information as still accurate & up-to-date; edit [address] field as needed.)    Note: Assess and validate information in current ACP documents, as indicated. Care Preferences    Ventilation: \"If you were in your present state of health and suddenly became very ill and were unable to breathe on your own, what would your preference be about the use of a ventilator (breathing machine) if it were available to you? \"      Would the patient desire the use of ventilator (breathing machine)?: yes    \"If your health worsens and it becomes clear that your chance of recovery is unlikely, what would your preference be about the use of a ventilator (breathing machine) if it were available to you? \"     Would the patient desire the use of ventilator (breathing machine)?: Yes      Resuscitation  \"CPR works best to restart the heart when there is a sudden event, like a heart attack, in someone who is otherwise healthy.  Unfortunately, CPR does not typically restart the heart for people who have serious health conditions or who are very sick.\"    \"In the event your heart stopped as a result of an underlying serious health condition, would you want attempts to be made to restart your heart (answer \"yes\" for attempt to resuscitate) or would you prefer a natural death (answer \"no\" for do not attempt to resuscitate)? \" yes      NOTE: If the patient has a valid advance directive AND now provides care preference(s) that are inconsistent with that prior directive, advise the patient to consider either: creating a new advance directive that complies with state-specific requirements; or, if that is not possible, orally revoking that prior directive in accordance with state-specific requirements, which must be documented in the EHR. [] Yes   [] No   Educated Patient / [place] [facility] regarding differences between Advance Directives and portable DNR orders.     Length of ACP Conversation in minutes:  11  Conversation Outcomes:  [x] ACP discussion completed  [] Existing advance directive reviewed with patient; no changes to patient's previously recorded wishes  [] New Advance Directive completed  [] Portable Do Not Rescitate prepared for Provider review and signature  [] POLST/POST/MOLST/MOST prepared for Provider review and signature      Follow-up plan:    [] Schedule follow-up conversation to continue planning  [] Referred individual to Provider for additional questions/concerns   [] Advised patient/agent/surrogate to review completed ACP document and update if needed with changes in condition, patient preferences or care setting    [] This note routed to one or more involved healthcare providers

## 2020-09-11 ENCOUNTER — CARE COORDINATION (OUTPATIENT)
Dept: CARE COORDINATION | Age: 35
End: 2020-09-11

## 2020-09-11 LAB
CULTURE: NORMAL
Lab: NORMAL
SPECIMEN: NORMAL

## 2020-09-14 ENCOUNTER — OFFICE VISIT (OUTPATIENT)
Dept: FAMILY MEDICINE CLINIC | Age: 35
End: 2020-09-14
Payer: COMMERCIAL

## 2020-09-14 ENCOUNTER — HOSPITAL ENCOUNTER (OUTPATIENT)
Age: 35
Setting detail: SPECIMEN
Discharge: HOME | End: 2020-09-14
Payer: COMMERCIAL

## 2020-09-14 VITALS
HEIGHT: 73 IN | WEIGHT: 196.8 LBS | HEART RATE: 95 BPM | DIASTOLIC BLOOD PRESSURE: 86 MMHG | TEMPERATURE: 98 F | OXYGEN SATURATION: 97 % | SYSTOLIC BLOOD PRESSURE: 116 MMHG | BODY MASS INDEX: 26.08 KG/M2

## 2020-09-14 LAB
ALBUMIN SERPL-MCNC: 4.9 GM/DL (ref 3.4–5)
ALP BLD-CCNC: 81 IU/L (ref 40–128)
ALT SERPL-CCNC: 27 U/L (ref 10–40)
ANION GAP SERPL CALCULATED.3IONS-SCNC: 15 MMOL/L (ref 4–16)
AST SERPL-CCNC: 22 IU/L (ref 15–37)
BASOPHILS ABSOLUTE: 0.1 K/CU MM
BASOPHILS RELATIVE PERCENT: 0.7 % (ref 0–1)
BILIRUB SERPL-MCNC: 0.5 MG/DL (ref 0–1)
BUN BLDV-MCNC: 6 MG/DL (ref 6–23)
CALCIUM SERPL-MCNC: 9.7 MG/DL (ref 8.3–10.6)
CHLORIDE BLD-SCNC: 102 MMOL/L (ref 99–110)
CO2: 25 MMOL/L (ref 21–32)
CREAT SERPL-MCNC: 0.9 MG/DL (ref 0.9–1.3)
DIFFERENTIAL TYPE: ABNORMAL
EOSINOPHILS ABSOLUTE: 0.1 K/CU MM
EOSINOPHILS RELATIVE PERCENT: 1.1 % (ref 0–3)
GFR AFRICAN AMERICAN: >60 ML/MIN/1.73M2
GFR NON-AFRICAN AMERICAN: >60 ML/MIN/1.73M2
GLUCOSE BLD-MCNC: 90 MG/DL (ref 70–99)
HCT VFR BLD CALC: 45.9 % (ref 42–52)
HEMOGLOBIN: 15.2 GM/DL (ref 13.5–18)
IMMATURE NEUTROPHIL %: 0.5 % (ref 0–0.43)
LYMPHOCYTES ABSOLUTE: 2.1 K/CU MM
LYMPHOCYTES RELATIVE PERCENT: 19.9 % (ref 24–44)
MAGNESIUM: 1.9 MG/DL (ref 1.8–2.4)
MCH RBC QN AUTO: 32.2 PG (ref 27–31)
MCHC RBC AUTO-ENTMCNC: 33.1 % (ref 32–36)
MCV RBC AUTO: 97.2 FL (ref 78–100)
MONOCYTES ABSOLUTE: 1 K/CU MM
MONOCYTES RELATIVE PERCENT: 9.5 % (ref 0–4)
NUCLEATED RBC %: 0 %
PDW BLD-RTO: 13.2 % (ref 11.7–14.9)
PLATELET # BLD: 290 K/CU MM (ref 140–440)
PMV BLD AUTO: 11.1 FL (ref 7.5–11.1)
POTASSIUM SERPL-SCNC: 4 MMOL/L (ref 3.5–5.1)
RBC # BLD: 4.72 M/CU MM (ref 4.6–6.2)
SEGMENTED NEUTROPHILS ABSOLUTE COUNT: 7.1 K/CU MM
SEGMENTED NEUTROPHILS RELATIVE PERCENT: 68.3 % (ref 36–66)
SODIUM BLD-SCNC: 142 MMOL/L (ref 135–145)
T4 FREE: 0.85 NG/DL (ref 0.9–1.8)
TOTAL CK: 136 IU/L (ref 38–174)
TOTAL IMMATURE NEUTOROPHIL: 0.05 K/CU MM
TOTAL NUCLEATED RBC: 0 K/CU MM
TOTAL PROTEIN: 7.7 GM/DL (ref 6.4–8.2)
TSH HIGH SENSITIVITY: 0.74 UIU/ML (ref 0.27–4.2)
WBC # BLD: 10.4 K/CU MM (ref 4–10.5)

## 2020-09-14 PROCEDURE — 85025 COMPLETE CBC W/AUTO DIFF WBC: CPT

## 2020-09-14 PROCEDURE — 82550 ASSAY OF CK (CPK): CPT

## 2020-09-14 PROCEDURE — 84439 ASSAY OF FREE THYROXINE: CPT

## 2020-09-14 PROCEDURE — 99202 OFFICE O/P NEW SF 15 MIN: CPT | Performed by: NURSE PRACTITIONER

## 2020-09-14 PROCEDURE — 83735 ASSAY OF MAGNESIUM: CPT

## 2020-09-14 PROCEDURE — 80053 COMPREHEN METABOLIC PANEL: CPT

## 2020-09-14 PROCEDURE — 84443 ASSAY THYROID STIM HORMONE: CPT

## 2020-09-14 ASSESSMENT — PATIENT HEALTH QUESTIONNAIRE - PHQ9
SUM OF ALL RESPONSES TO PHQ9 QUESTIONS 1 & 2: 0
1. LITTLE INTEREST OR PLEASURE IN DOING THINGS: 0
2. FEELING DOWN, DEPRESSED OR HOPELESS: 0
SUM OF ALL RESPONSES TO PHQ QUESTIONS 1-9: 0
SUM OF ALL RESPONSES TO PHQ QUESTIONS 1-9: 0

## 2020-09-14 ASSESSMENT — ENCOUNTER SYMPTOMS
SHORTNESS OF BREATH: 1
COUGH: 0
WHEEZING: 0
CHEST TIGHTNESS: 0

## 2020-09-14 NOTE — LETTER
[address] [facility],2[address] Floor WALK-IN CARE  [address] [place] [address] [address] [facility]  Phone: [phone]  Fax: [phone]    DANIELLE Lindsay CNP        September 14, 2020     Patient: Jenny Lock   YOB: 1985   Date of Visit: 9/14/2020       To Whom it May Concern:    Jenny Lock was seen in my clinic on 9/14/2020. He may return to work on 9/20/2020. If you have any questions or concerns, please don't hesitate to call.     Sincerely,           DANIELLE Lindsay CNP

## 2020-09-14 NOTE — PROGRESS NOTES
Mathew Goff   37 y.o.  male  [id number]      Chief Complaint   Patient presents with   [facility] ED Follow-up     acute kidney failure, not feeling any better, but not worse either    Letter for School/Work     requesting more time off of work        Subjective:  37 y.o.male is here for a follow up. He has the following chronic/acute medical problems:  Patient Active Problem List   Diagnosis    JORGE (acute kidney injury) [facility])       Patient is here for a ER follow up. Patient was admitted to the hospital on 9/2/2020 when found to have acute kidney injury with hyponatremia. This was likely due hypovolemia. His creatine improved from 2.5 to almost normal prior to discharge with IV fluids. The hypercalcemia improved also. Patient's muscle cramps improved while in the hospital with IV fluids. His CK trended down. Patient went back to the emergency room on 9/10/2020 with complaints of flank pain and continue muscle pains. Patient states these symptoms continue since he was discharged from the hospital. CBC is within normal limits. CMP showed elevated anion gap 17 otherwise within normal limits. CK is 169. Lipase 25. D-dimer is less than 200. CY scan of abdomen and pelvis showed no acute intra-abdominal process. Nonobstructing stones in the kidney, sacroiliitis. Patient was told these finding were not related to his symptoms. Patient states he is still having muscle aches and still does not feel well. Patient requesting to go back to work on Sunday to give him a few more days. He states his work weeks is Sunday to Wednesday. Patient is requesting his paperwork be filled out for the time he has been off work. Review of Systems   Constitutional: Positive for appetite change and fatigue. Negative for chills and fever. HENT: Negative. Respiratory: Positive for shortness of breath (states if he exerts self). Negative for cough, chest tightness and wheezing.     Cardiovascular: Negative for chest pain and palpitations. Gastrointestinal: Negative for diarrhea, nausea and vomiting. Musculoskeletal: Positive for myalgias. Skin: Negative for rash. Neurological: Negative for dizziness, light-headedness and headaches. Current Outpatient Medications   Medication Sig Dispense Refill    amphetamine-dextroamphetamine (ADDERALL) 30 MG tablet Take 30 mg by mouth 2 times daily.  diazePAM (VALIUM) 10 MG tablet Take 10 mg by mouth every 12 hours as needed for Anxiety. No current facility-administered medications for this visit. Past medical, family,surgical history reviewed today. Objective:  /86   Pulse 95   Temp 98 °F (36.7 °C) (Infrared)   Ht 6' 1\" (1.854 m)   Wt 196 lb 12.8 oz (89.3 kg)   SpO2 97%   BMI 25.96 kg/m²   BP Readings from Last 3 Encounters:   09/14/20 116/86   09/10/20 (!) 151/61   09/03/20 131/66     Wt Readings from Last 3 Encounters:   09/14/20 196 lb 12.8 oz (89.3 kg)   09/02/20 193 lb (87.5 kg)         Physical Exam  Constitutional:       Appearance: Normal appearance. HENT:      Head: Normocephalic. Neck:      Musculoskeletal: Neck supple. Cardiovascular:      Rate and Rhythm: Normal rate and regular rhythm. Heart sounds: Normal heart sounds. Pulmonary:      Effort: Pulmonary effort is normal.      Breath sounds: Normal breath sounds. Skin:     General: Skin is warm and dry. Neurological:      Mental Status: He is alert and oriented to person, place, and time.    Psychiatric:         Mood and Affect: Mood normal.         Behavior: Behavior normal.         Lab Results   Component Value Date    WBC 10.4 09/14/2020    HGB 15.2 09/14/2020    HCT 45.9 09/14/2020    MCV 97.2 09/14/2020     09/14/2020     Lab Results   Component Value Date     09/14/2020    K 4.0 09/14/2020     09/14/2020    CO2 25 09/14/2020    BUN 6 09/14/2020    CREATININE 0.9 09/14/2020    GLUCOSE 90 09/14/2020    CALCIUM 9.7 09/14/2020    PROT 7.7 09/14/2020 LABALBU 4.9 09/14/2020    BILITOT 0.5 09/14/2020    ALKPHOS 81 09/14/2020    AST 22 09/14/2020    ALT 27 09/14/2020    LABGLOM >60 09/14/2020    GFRAA >60 09/14/2020     No results found for: CHOL  No results found for: TRIG  No results found for: HDL  No results found for: LDLCALC, LDLCHOLESTEROL  No results found for: LABA1C  Lab Results   Component Value Date    TSHHS 0.743 09/14/2020         ASSESSMENT/PLAN:      1. Muscle cramping  Explained to patient will order blood work to make sure he is not having any changes. Advised patient to establish with a PCP. Patient verbalized understanding. Will fill out paper work for short term disability. Ordered a CBC, CK, CMP, magnesium. 2. Thyroid disorder screen  TSH and T4 free. No orders of the defined types were placed in this encounter. There are no discontinued medications. No orders of the defined types were placed in this encounter. Care discussed with patient. Questions answered. Patient verbalizes understanding and agrees with plan. After visit summary provided. Advised to call for any problems, questions, or concerns. Return if symptoms worsen or fail to improve.                                              Signed:  DANIELLE Vogel CNP  09/17/20  2:04 PM

## 2020-09-14 NOTE — CARE COORDINATION
Patient contacted regarding recent discharge and COVID-19 risk. Discussed COVID-19 related testing which was not done at this time. Test results were not done. Patient informed of results, if available? n/a     Care Transition Nurse/ Ambulatory Care Manager contacted the patient by telephone to perform post discharge assessment. Verified name and  with patient as identifiers. Patient has following risk factors of: history of kidney stones. CTN/ACM reviewed discharge instructions, medical action plan and red flags related to discharge diagnosis. Reviewed and educated them on any new and changed medications related to discharge diagnosis. Advised obtaining a 90-day supply of all daily and as-needed medications. Education provided regarding infection prevention, and signs and symptoms of COVID-19 and when to seek medical attention with patient who verbalized understanding. Discussed exposure protocols and quarantine from [address] you at higher risk for severe illness  and given an opportunity for questions and concerns. The patient agrees to contact the COVID-19 hotline [phone] or PCP office for questions related to their healthcare. CTN/ACM provided contact information for future reference. From CDC: Are you at higher risk for severe illness?  Wash your hands often.  Avoid close contact (6 feet, which is about two arm lengths) with people who are sick.  Put distance between yourself and other people if COVID-19 is spreading in your community.  Clean and disinfect frequently touched surfaces.  Avoid all cruise travel and non-essential air travel.  Call your healthcare professional if you have concerns about COVID-19 and your underlying condition or if you are sick. For more information on steps you can take to protect yourself, see CDC's How to [place] for follow-up call in 5-7 days based on severity of symptoms and risk factors.     Pt reports he is still feeling the same as he did in the ED: abdominal pain and body aches. He is trying to stay well-hydrated and rest at this time. He reports he will be calling local PCP offices to establish care as he is from [place], but staying in [place] at this time. [facility] offered to assist pt in calling and scheduling with a new PCP and he stated he would do so once this call ended. He also reports he will call ACM back when he finds a new PCP or if he needs further assistance.

## 2020-09-17 ASSESSMENT — ENCOUNTER SYMPTOMS
NAUSEA: 0
DIARRHEA: 0
VOMITING: 0

## 2020-09-18 ENCOUNTER — CARE COORDINATION (OUTPATIENT)
Dept: CARE COORDINATION | Age: 35
End: 2020-09-18

## 2020-09-18 NOTE — CARE COORDINATION
You Patient resolved from the Care Transitions episode on 9.18.20  Discussed COVID-19 related testing which was not done at this time. Test results were not done. Patient informed of results, if available? n/a    Patient/family has been provided the following resources and education related to COVID-19:                         Signs, symptoms and red flags related to COVID-19            CDC exposure and quarantine guidelines            [facility] exposure contact - [phone]            Contact for their local Department of Health                 Patient currently reports that the following symptoms have improved:  fatigue and no new/worsening symptoms     No further outreach scheduled with this CTN/ACM. Episode of Care resolved. Patient has this CTN/ACM contact information if future needs arise. Pt reports he is feeling better, but still tired/fatigued. He f/u with a walk in clinic through [facility] ([facility] and will continue to look for a new PCP. He had further blood work completed at his f/u appointment. Pt asked ACM if ACM is able to notify his employer when he can return to work. ACM informed pt that typically he would need to obtain a letter from the provider's office that f/u with him. Pt voiced understanding. Pt did not have any other questions/concerns at this time.

## 2020-10-21 ENCOUNTER — APPOINTMENT (OUTPATIENT)
Dept: GENERAL RADIOLOGY | Age: 35
End: 2020-10-21
Payer: COMMERCIAL

## 2020-10-21 ENCOUNTER — HOSPITAL ENCOUNTER (EMERGENCY)
Age: 35
Discharge: HOME | End: 2020-10-21
Attending: FAMILY MEDICINE
Payer: COMMERCIAL

## 2020-10-21 ENCOUNTER — APPOINTMENT (OUTPATIENT)
Dept: CT IMAGING | Age: 35
End: 2020-10-21
Payer: COMMERCIAL

## 2020-10-21 VITALS
RESPIRATION RATE: 17 BRPM | HEART RATE: 103 BPM | TEMPERATURE: 97.9 F | OXYGEN SATURATION: 95 % | DIASTOLIC BLOOD PRESSURE: 95 MMHG | SYSTOLIC BLOOD PRESSURE: 120 MMHG | HEIGHT: 74 IN | WEIGHT: 193 LBS | BODY MASS INDEX: 24.77 KG/M2

## 2020-10-21 LAB
ALBUMIN SERPL-MCNC: 4.5 GM/DL (ref 3.4–5)
ALP BLD-CCNC: 116 IU/L (ref 40–129)
ALT SERPL-CCNC: 68 U/L (ref 10–40)
ANION GAP SERPL CALCULATED.3IONS-SCNC: 19 MMOL/L (ref 4–16)
AST SERPL-CCNC: 96 IU/L (ref 15–37)
BASOPHILS ABSOLUTE: 0.1 K/CU MM
BASOPHILS RELATIVE PERCENT: 1 % (ref 0–1)
BILIRUB SERPL-MCNC: 0.8 MG/DL (ref 0–1)
BUN BLDV-MCNC: 4 MG/DL (ref 6–23)
CALCIUM SERPL-MCNC: 8.8 MG/DL (ref 8.3–10.6)
CHLORIDE BLD-SCNC: 103 MMOL/L (ref 99–110)
CO2: 21 MMOL/L (ref 21–32)
CREAT SERPL-MCNC: 0.9 MG/DL (ref 0.9–1.3)
DIFFERENTIAL TYPE: ABNORMAL
EOSINOPHILS ABSOLUTE: 0.1 K/CU MM
EOSINOPHILS RELATIVE PERCENT: 1 % (ref 0–3)
GFR AFRICAN AMERICAN: >60 ML/MIN/1.73M2
GFR NON-AFRICAN AMERICAN: >60 ML/MIN/1.73M2
GLUCOSE BLD-MCNC: 85 MG/DL (ref 70–99)
HCT VFR BLD CALC: 47.5 % (ref 42–52)
HEMOGLOBIN: 16 GM/DL (ref 13.5–18)
IMMATURE NEUTROPHIL %: 0.6 % (ref 0–0.43)
LYMPHOCYTES ABSOLUTE: 2.1 K/CU MM
LYMPHOCYTES RELATIVE PERCENT: 31.9 % (ref 24–44)
MAGNESIUM: 2 MG/DL (ref 1.8–2.4)
MCH RBC QN AUTO: 33 PG (ref 27–31)
MCHC RBC AUTO-ENTMCNC: 33.7 % (ref 32–36)
MCV RBC AUTO: 97.9 FL (ref 78–100)
MONOCYTES ABSOLUTE: 0.8 K/CU MM
MONOCYTES RELATIVE PERCENT: 12.6 % (ref 0–4)
NUCLEATED RBC %: 0 %
PDW BLD-RTO: 13.2 % (ref 11.7–14.9)
PLATELET # BLD: 221 K/CU MM (ref 140–440)
PMV BLD AUTO: 10.1 FL (ref 7.5–11.1)
POTASSIUM SERPL-SCNC: 3.8 MMOL/L (ref 3.5–5.1)
RBC # BLD: 4.85 M/CU MM (ref 4.6–6.2)
SEGMENTED NEUTROPHILS ABSOLUTE COUNT: 3.5 K/CU MM
SEGMENTED NEUTROPHILS RELATIVE PERCENT: 52.9 % (ref 36–66)
SODIUM BLD-SCNC: 143 MMOL/L (ref 135–145)
TOTAL CK: 146 IU/L (ref 38–174)
TOTAL IMMATURE NEUTOROPHIL: 0.04 K/CU MM
TOTAL NUCLEATED RBC: 0 K/CU MM
TOTAL PROTEIN: 7.7 GM/DL (ref 6.4–8.2)
TROPONIN T: <0.01 NG/ML
WBC # BLD: 6.7 K/CU MM (ref 4–10.5)

## 2020-10-21 PROCEDURE — 99285 EMERGENCY DEPT VISIT HI MDM: CPT

## 2020-10-21 PROCEDURE — 85025 COMPLETE CBC W/AUTO DIFF WBC: CPT

## 2020-10-21 PROCEDURE — 71260 CT THORAX DX C+: CPT

## 2020-10-21 PROCEDURE — 93005 ELECTROCARDIOGRAM TRACING: CPT | Performed by: EMERGENCY MEDICINE

## 2020-10-21 PROCEDURE — 80053 COMPREHEN METABOLIC PANEL: CPT

## 2020-10-21 PROCEDURE — 6360000004 HC RX CONTRAST MEDICATION: Performed by: FAMILY MEDICINE

## 2020-10-21 PROCEDURE — 94761 N-INVAS EAR/PLS OXIMETRY MLT: CPT

## 2020-10-21 PROCEDURE — 36415 COLL VENOUS BLD VENIPUNCTURE: CPT

## 2020-10-21 PROCEDURE — 83735 ASSAY OF MAGNESIUM: CPT

## 2020-10-21 PROCEDURE — 84484 ASSAY OF TROPONIN QUANT: CPT

## 2020-10-21 PROCEDURE — 96374 THER/PROPH/DIAG INJ IV PUSH: CPT

## 2020-10-21 PROCEDURE — 82550 ASSAY OF CK (CPK): CPT

## 2020-10-21 PROCEDURE — 71045 X-RAY EXAM CHEST 1 VIEW: CPT

## 2020-10-21 PROCEDURE — 2580000003 HC RX 258: Performed by: FAMILY MEDICINE

## 2020-10-21 PROCEDURE — 6360000002 HC RX W HCPCS: Performed by: FAMILY MEDICINE

## 2020-10-21 RX ORDER — 0.9 % SODIUM CHLORIDE 0.9 %
1000 INTRAVENOUS SOLUTION INTRAVENOUS ONCE
Status: COMPLETED | OUTPATIENT
Start: 2020-10-21 | End: 2020-10-21

## 2020-10-21 RX ORDER — HYDROCODONE BITARTRATE AND ACETAMINOPHEN 5; 325 MG/1; MG/1
1 TABLET ORAL EVERY 8 HOURS PRN
Qty: 15 TABLET | Refills: 0 | Status: SHIPPED | OUTPATIENT
Start: 2020-10-21 | End: 2020-10-26

## 2020-10-21 RX ORDER — SODIUM CHLORIDE 0.9 % (FLUSH) 0.9 %
10 SYRINGE (ML) INJECTION 2 TIMES DAILY
Status: DISCONTINUED | OUTPATIENT
Start: 2020-10-21 | End: 2020-10-21 | Stop reason: HOSPADM

## 2020-10-21 RX ORDER — KETOROLAC TROMETHAMINE 30 MG/ML
30 INJECTION, SOLUTION INTRAMUSCULAR; INTRAVENOUS ONCE
Status: COMPLETED | OUTPATIENT
Start: 2020-10-21 | End: 2020-10-21

## 2020-10-21 RX ADMIN — IOPAMIDOL 80 ML: 755 INJECTION, SOLUTION INTRAVENOUS at 19:40

## 2020-10-21 RX ADMIN — KETOROLAC TROMETHAMINE 30 MG: 30 INJECTION, SOLUTION INTRAMUSCULAR; INTRAVENOUS at 18:34

## 2020-10-21 RX ADMIN — SODIUM CHLORIDE, PRESERVATIVE FREE 10 ML: 5 INJECTION INTRAVENOUS at 19:41

## 2020-10-21 RX ADMIN — SODIUM CHLORIDE 1000 ML: 9 INJECTION, SOLUTION INTRAVENOUS at 18:33

## 2020-10-21 ASSESSMENT — PAIN SCALES - GENERAL
PAINLEVEL_OUTOF10: 7
PAINLEVEL_OUTOF10: 8

## 2020-10-21 ASSESSMENT — PAIN DESCRIPTION - PAIN TYPE: TYPE: ACUTE PAIN

## 2020-10-21 NOTE — ED PROVIDER NOTES
12 lead EKG per my interpretation:  Sinus Tachycardia at 117  Axis is   Normal  QTc is  within an acceptable range  There is no specific T wave changes appreciated. There is no specific ST wave changes appreciated. No STEMI    Prior EKG to compare with was available and no clinically significant change in overall morphology.     Yfn Turpin MD  10/21/20 [id number]

## 2020-10-22 PROCEDURE — 93010 ELECTROCARDIOGRAM REPORT: CPT | Performed by: INTERNAL MEDICINE

## 2020-10-22 NOTE — ED PROVIDER NOTES
Triage Chief Complaint:   Chest Pain (ongoing since 9/2. Ty Founds intermittent mid sternal 8/10 burning) and Spasms (reports last time was dx with rhabdo)    [place]:  Aniceto Marie is a 37 y.o. male that presents unchanged intermittent midsternal chest pain has been going on since September and has gotten slightly worse today so he presents for evaluation. Patient also with diffuse muscle aches and cramps. He is concerned that he may be back in rhabdo. No fevers chills. No infectious symptoms. No coryza. No dysuria hematuria. No rash. No red hot swollen joints. No oral lesions. No skin sloughing of hands. ROS:  General:  No fevers, no chills, no weakness  Eyes:  No recent vison changes, no discharge  ENT:  No sore throat, no nasal congestion, no hearing changes  Cardiovascular: As above  Respiratory:  No shortness of breath, no cough, no wheezing  Gastrointestinal:  No pain, no nausea, no vomiting, no diarrhea  Musculoskeletal:  No muscle pain, no joint pain  Skin:  No rash, no pruritis, no easy bruising  Neurologic:  No speech problems, no headache, no extremity numbness, no extremity tingling, no extremity weakness  Psychiatric:  No anxiety, no hallucinations or delusions, no suicidal or homicidal ideation  Genitourinary:  No dysuria, no hematuria  Endocrine:  No unexpected weight gain, no unexpected weight loss  Extremities:  no edema, no pain    Past Medical History:   Diagnosis Date    Kidney stone      History reviewed. No pertinent surgical history. History reviewed. No pertinent family history.   Social History     Socioeconomic History    Marital status: Single     Spouse name: Not on file    Number of children: Not on file    Years of education: Not on file    Highest education level: Not on file   Occupational History    Not on file   Social Needs    Financial resource strain: Not on file    Food insecurity     Worry: Not on file     Inability: Not on file   [employer] needs filling defects in the pulmonary arteries however the possibility of small distal pulmonary emboli cannot be totally excluded on this study due to poor bolus timing. There is no evidence for right ventricular strain. MDM:  29-year-old male with history and physical as above and concern for potential rhabdo as well as ongoing chest pain for the last 2 months. EKG and troponin negative. Doubt ACS. . No evidence of rhabdo. Renal function normal.  No evidence of JORGE. Electrolytes normal.  No evidence of electrolyte abnormalities. CT chest negative for acute infiltrate or effusion or thoracic abnormality or central filling defects and no evidence of acute intrapulmonary abnormality. Hemoglobin normal no evidence of acute blood loss. No leukocytosis. I discussed with patient the results of evaluation in the ED, diagnosis, care, and prognosis. The plan is to discharge to home. Patient is in agreement with plan and questions have been answered. I also discussed with patient the reasons which may require a return visit and the importance of follow-up care. The patient is well-appearing, nontoxic, and improved at the time of discharge. Patient agrees to call to arrange follow-up care as directed. Patient understands to return immediately for worsening/change in symptoms. I estimate there is LOW risk for (including but not limited to) ACUTE CORONARY SYNDROME, PULMONARY EMBOLISM, PNEUMOTHORAX, RUPTURED ESOPHAGUS OR THORACIC AORTIC DISSECTION, thus I consider the discharge disposition reasonable. Cooper Horn (or their surrogate) and I have discussed the diagnosis and risks, and we agree with discharging home with close follow-up. We also discussed returning to the Emergency Department immediately if new or worsening symptoms occur. We have discussed the symptoms which are most concerning that necessitate immediate return. Clinical Impression:  1.  Chest pain, unspecified type      Disposition referral (if applicable):  [facility] Emergency Department  [address]  [phone]    If symptoms worsen    Disposition medications (if applicable):  New Prescriptions    HYDROCODONE-ACETAMINOPHEN (NORCO) 5-325 MG PER TABLET    Take 1 tablet by mouth every 8 hours as needed for Pain for up to 5 days. Intended supply: 7 days. Take lowest dose possible to manage pain       Comment: Please note this report has been produced using speech recognition software and may contain errors related to that system including errors in grammar, punctuation, and spelling, as well as words and phrases that may be inappropriate. If there are any questions or concerns please feel free to contact the dictating provider for clarification.       Katerin Lopez MD  10/21/20 2008

## 2020-10-28 LAB
EKG ATRIAL RATE: 117 BPM
EKG DIAGNOSIS: NORMAL
EKG P AXIS: 66 DEGREES
EKG P-R INTERVAL: 118 MS
EKG Q-T INTERVAL: 302 MS
EKG QRS DURATION: 70 MS
EKG QTC CALCULATION (BAZETT): 421 MS
EKG R AXIS: 61 DEGREES
EKG T AXIS: 64 DEGREES
EKG VENTRICULAR RATE: 117 BPM

## 2020-11-03 ENCOUNTER — APPOINTMENT (OUTPATIENT)
Dept: ULTRASOUND IMAGING | Age: 35
DRG: 694 | End: 2020-11-03
Payer: COMMERCIAL

## 2020-11-03 ENCOUNTER — HOSPITAL ENCOUNTER (INPATIENT)
Age: 35
LOS: 1 days | Discharge: LEFT BEFORE DISCHARGE | DRG: 694 | End: 2020-11-05
Attending: EMERGENCY MEDICINE | Admitting: INTERNAL MEDICINE
Payer: COMMERCIAL

## 2020-11-03 LAB
ALBUMIN SERPL-MCNC: 4.6 GM/DL (ref 3.4–5)
ALCOHOL SCREEN SERUM: <0.01 %WT/VOL
ALP BLD-CCNC: 145 IU/L (ref 40–129)
ALT SERPL-CCNC: 119 U/L (ref 10–40)
ANION GAP SERPL CALCULATED.3IONS-SCNC: 16 MMOL/L (ref 4–16)
AST SERPL-CCNC: 127 IU/L (ref 15–37)
BACTERIA: NEGATIVE /HPF
BASOPHILS ABSOLUTE: 0 K/CU MM
BASOPHILS RELATIVE PERCENT: 0.2 % (ref 0–1)
BILIRUB SERPL-MCNC: 2.2 MG/DL (ref 0–1)
BILIRUBIN URINE: NEGATIVE MG/DL
BLOOD, URINE: ABNORMAL
BUN BLDV-MCNC: 4 MG/DL (ref 6–23)
CALCIUM SERPL-MCNC: 9.6 MG/DL (ref 8.3–10.6)
CHLORIDE BLD-SCNC: 94 MMOL/L (ref 99–110)
CLARITY: CLEAR
CO2: 21 MMOL/L (ref 21–32)
COLOR: ABNORMAL
CREAT SERPL-MCNC: 0.9 MG/DL (ref 0.9–1.3)
DIFFERENTIAL TYPE: ABNORMAL
EOSINOPHILS ABSOLUTE: 0 K/CU MM
EOSINOPHILS RELATIVE PERCENT: 0.2 % (ref 0–3)
GFR AFRICAN AMERICAN: >60 ML/MIN/1.73M2
GFR NON-AFRICAN AMERICAN: >60 ML/MIN/1.73M2
GLUCOSE BLD-MCNC: 103 MG/DL (ref 70–99)
GLUCOSE, URINE: NEGATIVE MG/DL
HCT VFR BLD CALC: 48.6 % (ref 42–52)
HEMOGLOBIN: 17.1 GM/DL (ref 13.5–18)
IMMATURE NEUTROPHIL %: 0.2 % (ref 0–0.43)
KETONES, URINE: ABNORMAL MG/DL
LEUKOCYTE ESTERASE, URINE: ABNORMAL
LYMPHOCYTES ABSOLUTE: 1.4 K/CU MM
LYMPHOCYTES RELATIVE PERCENT: 17.2 % (ref 24–44)
MCH RBC QN AUTO: 33.8 PG (ref 27–31)
MCHC RBC AUTO-ENTMCNC: 35.2 % (ref 32–36)
MCV RBC AUTO: 96 FL (ref 78–100)
MONOCYTES ABSOLUTE: 1.3 K/CU MM
MONOCYTES RELATIVE PERCENT: 14.9 % (ref 0–4)
MUCUS: ABNORMAL HPF
NITRITE URINE, QUANTITATIVE: NEGATIVE
NUCLEATED RBC %: 0 %
PDW BLD-RTO: 12.8 % (ref 11.7–14.9)
PH, URINE: 6 (ref 5–8)
PLATELET # BLD: 188 K/CU MM (ref 140–440)
PMV BLD AUTO: 11.4 FL (ref 7.5–11.1)
POTASSIUM SERPL-SCNC: 3.6 MMOL/L (ref 3.5–5.1)
PROTEIN UA: 30 MG/DL
RBC # BLD: 5.06 M/CU MM (ref 4.6–6.2)
RBC URINE: 4 /HPF (ref 0–3)
SEGMENTED NEUTROPHILS ABSOLUTE COUNT: 5.6 K/CU MM
SEGMENTED NEUTROPHILS RELATIVE PERCENT: 67.3 % (ref 36–66)
SODIUM BLD-SCNC: 131 MMOL/L (ref 135–145)
SPECIFIC GRAVITY UA: 1.01 (ref 1–1.03)
SQUAMOUS EPITHELIAL: <1 /HPF
TOTAL CK: 88 IU/L (ref 38–174)
TOTAL IMMATURE NEUTOROPHIL: 0.02 K/CU MM
TOTAL NUCLEATED RBC: 0 K/CU MM
TOTAL PROTEIN: 7.9 GM/DL (ref 6.4–8.2)
TRICHOMONAS: ABNORMAL /HPF
TROPONIN T: <0.01 NG/ML
UROBILINOGEN, URINE: 2 MG/DL (ref 0.2–1)
WBC # BLD: 8.4 K/CU MM (ref 4–10.5)
WBC UA: 7 /HPF (ref 0–2)

## 2020-11-03 PROCEDURE — 84484 ASSAY OF TROPONIN QUANT: CPT

## 2020-11-03 PROCEDURE — 96374 THER/PROPH/DIAG INJ IV PUSH: CPT

## 2020-11-03 PROCEDURE — 82550 ASSAY OF CK (CPK): CPT

## 2020-11-03 PROCEDURE — 85025 COMPLETE CBC W/AUTO DIFF WBC: CPT

## 2020-11-03 PROCEDURE — 80074 ACUTE HEPATITIS PANEL: CPT

## 2020-11-03 PROCEDURE — 96375 TX/PRO/DX INJ NEW DRUG ADDON: CPT

## 2020-11-03 PROCEDURE — 93005 ELECTROCARDIOGRAM TRACING: CPT | Performed by: EMERGENCY MEDICINE

## 2020-11-03 PROCEDURE — 99285 EMERGENCY DEPT VISIT HI MDM: CPT

## 2020-11-03 PROCEDURE — G0480 DRUG TEST DEF 1-7 CLASSES: HCPCS

## 2020-11-03 PROCEDURE — 2580000003 HC RX 258: Performed by: EMERGENCY MEDICINE

## 2020-11-03 PROCEDURE — 81001 URINALYSIS AUTO W/SCOPE: CPT

## 2020-11-03 PROCEDURE — 6360000002 HC RX W HCPCS: Performed by: EMERGENCY MEDICINE

## 2020-11-03 PROCEDURE — 80053 COMPREHEN METABOLIC PANEL: CPT

## 2020-11-03 PROCEDURE — 6370000000 HC RX 637 (ALT 250 FOR IP): Performed by: EMERGENCY MEDICINE

## 2020-11-03 PROCEDURE — 76705 ECHO EXAM OF ABDOMEN: CPT

## 2020-11-03 RX ORDER — 0.9 % SODIUM CHLORIDE 0.9 %
1000 INTRAVENOUS SOLUTION INTRAVENOUS ONCE
Status: COMPLETED | OUTPATIENT
Start: 2020-11-03 | End: 2020-11-04

## 2020-11-03 RX ORDER — ONDANSETRON 2 MG/ML
8 INJECTION INTRAMUSCULAR; INTRAVENOUS ONCE
Status: COMPLETED | OUTPATIENT
Start: 2020-11-03 | End: 2020-11-03

## 2020-11-03 RX ORDER — ACETAMINOPHEN 325 MG/1
650 TABLET ORAL ONCE
Status: COMPLETED | OUTPATIENT
Start: 2020-11-03 | End: 2020-11-03

## 2020-11-03 RX ADMIN — ACETAMINOPHEN 650 MG: 325 TABLET ORAL at 22:48

## 2020-11-03 RX ADMIN — SODIUM CHLORIDE 1000 ML: 9 INJECTION, SOLUTION INTRAVENOUS at 22:48

## 2020-11-03 RX ADMIN — ONDANSETRON 8 MG: 2 INJECTION INTRAMUSCULAR; INTRAVENOUS at 22:50

## 2020-11-03 ASSESSMENT — PAIN SCALES - GENERAL
PAINLEVEL_OUTOF10: 9
PAINLEVEL_OUTOF10: 9

## 2020-11-03 ASSESSMENT — PAIN DESCRIPTION - DESCRIPTORS: DESCRIPTORS: CONSTANT;SHARP

## 2020-11-03 ASSESSMENT — PAIN DESCRIPTION - FREQUENCY: FREQUENCY: INTERMITTENT

## 2020-11-03 ASSESSMENT — PAIN DESCRIPTION - ORIENTATION: ORIENTATION: RIGHT

## 2020-11-03 ASSESSMENT — PAIN DESCRIPTION - PAIN TYPE: TYPE: ACUTE PAIN

## 2020-11-03 ASSESSMENT — PAIN DESCRIPTION - LOCATION: LOCATION: ABDOMEN;BACK

## 2020-11-03 NOTE — ED TRIAGE NOTES
Pt to ED with c/o back and abdominal pain of right side, states hx of kidney stone and kidney failure.

## 2020-11-03 NOTE — LETTER
[address] [address]  Phone: [phone]    No name on file. November 4, 2020     Patient: Shaun Garcia   YOB: 1985   Date of Visit: 11/3/2020       To Whom It May Concern: It is my medical opinion that Shaun Garcia has been admitted to the hospital.    If you have any questions or concerns, please don't hesitate to call. Sincerely,        No name on file.

## 2020-11-03 NOTE — LETTER
[facility]  [address] [address]  Phone: [phone]             November 5, 2020    Patient: Basil Granados   YOB: 1985   Date of Visit: 11/3/2020       To Whom It May Concern:    Basil Granados was seen and treated in our facility  beginning 11/3/2020 until 11/05/2021  .       Sincerely,                Signature:__________________________________

## 2020-11-04 ENCOUNTER — APPOINTMENT (OUTPATIENT)
Dept: CT IMAGING | Age: 35
DRG: 694 | End: 2020-11-04
Payer: COMMERCIAL

## 2020-11-04 PROBLEM — R74.8 ABNORMAL LIVER ENZYMES: Status: ACTIVE | Noted: 2020-11-04

## 2020-11-04 LAB
EKG ATRIAL RATE: 114 BPM
EKG ATRIAL RATE: 97 BPM
EKG DIAGNOSIS: NORMAL
EKG DIAGNOSIS: NORMAL
EKG P AXIS: 29 DEGREES
EKG P AXIS: 52 DEGREES
EKG P-R INTERVAL: 114 MS
EKG P-R INTERVAL: 122 MS
EKG Q-T INTERVAL: 304 MS
EKG Q-T INTERVAL: 324 MS
EKG QRS DURATION: 70 MS
EKG QRS DURATION: 72 MS
EKG QTC CALCULATION (BAZETT): 411 MS
EKG QTC CALCULATION (BAZETT): 419 MS
EKG R AXIS: 47 DEGREES
EKG R AXIS: 62 DEGREES
EKG T AXIS: 47 DEGREES
EKG T AXIS: 63 DEGREES
EKG VENTRICULAR RATE: 114 BPM
EKG VENTRICULAR RATE: 97 BPM
FERRITIN: 351 NG/ML (ref 30–400)
HAV IGM SER IA-ACNC: NON REACTIVE
HEPATITIS B CORE IGM ANTIBODY: NON REACTIVE
HEPATITIS B SURFACE ANTIGEN: NON REACTIVE
HEPATITIS C ANTIBODY: NON REACTIVE
IGA: 379 MG/DL (ref 69–382)
IGG,SERUM: 933 MG/DL (ref 723–1685)
IGM,SERUM: 173 MG/DL (ref 62–277)
INR BLD: 1.01 INDEX
IRON: 207 UG/DL (ref 59–158)
LIPASE: 23 IU/L (ref 13–60)
PCT TRANSFERRIN: 84 % (ref 10–44)
PROTHROMBIN TIME: 12.2 SECONDS (ref 11.7–14.5)
TOTAL IRON BINDING CAPACITY: 245 UG/DL (ref 250–450)
UNSATURATED IRON BINDING CAPACITY: 38 UG/DL (ref 110–370)

## 2020-11-04 PROCEDURE — 83550 IRON BINDING TEST: CPT

## 2020-11-04 PROCEDURE — 6370000000 HC RX 637 (ALT 250 FOR IP): Performed by: INTERNAL MEDICINE

## 2020-11-04 PROCEDURE — 82784 ASSAY IGA/IGD/IGG/IGM EACH: CPT

## 2020-11-04 PROCEDURE — 6360000004 HC RX CONTRAST MEDICATION: Performed by: EMERGENCY MEDICINE

## 2020-11-04 PROCEDURE — 36415 COLL VENOUS BLD VENIPUNCTURE: CPT

## 2020-11-04 PROCEDURE — 6370000000 HC RX 637 (ALT 250 FOR IP): Performed by: EMERGENCY MEDICINE

## 2020-11-04 PROCEDURE — 83540 ASSAY OF IRON: CPT

## 2020-11-04 PROCEDURE — 82728 ASSAY OF FERRITIN: CPT

## 2020-11-04 PROCEDURE — 2580000003 HC RX 258: Performed by: EMERGENCY MEDICINE

## 2020-11-04 PROCEDURE — 74177 CT ABD & PELVIS W/CONTRAST: CPT

## 2020-11-04 PROCEDURE — 85610 PROTHROMBIN TIME: CPT

## 2020-11-04 PROCEDURE — 96365 THER/PROPH/DIAG IV INF INIT: CPT

## 2020-11-04 PROCEDURE — G0378 HOSPITAL OBSERVATION PER HR: HCPCS

## 2020-11-04 PROCEDURE — 2500000003 HC RX 250 WO HCPCS: Performed by: EMERGENCY MEDICINE

## 2020-11-04 PROCEDURE — 6360000002 HC RX W HCPCS: Performed by: INTERNAL MEDICINE

## 2020-11-04 PROCEDURE — 6370000000 HC RX 637 (ALT 250 FOR IP): Performed by: NURSE PRACTITIONER

## 2020-11-04 PROCEDURE — 82947 ASSAY GLUCOSE BLOOD QUANT: CPT

## 2020-11-04 PROCEDURE — 84460 ALANINE AMINO (ALT) (SGPT): CPT

## 2020-11-04 PROCEDURE — 82390 ASSAY OF CERULOPLASMIN: CPT

## 2020-11-04 PROCEDURE — 93010 ELECTROCARDIOGRAM REPORT: CPT | Performed by: INTERNAL MEDICINE

## 2020-11-04 PROCEDURE — 1200000000 HC SEMI PRIVATE

## 2020-11-04 PROCEDURE — 96372 THER/PROPH/DIAG INJ SC/IM: CPT

## 2020-11-04 PROCEDURE — 86256 FLUORESCENT ANTIBODY TITER: CPT

## 2020-11-04 PROCEDURE — 2580000003 HC RX 258: Performed by: INTERNAL MEDICINE

## 2020-11-04 PROCEDURE — 86038 ANTINUCLEAR ANTIBODIES: CPT

## 2020-11-04 PROCEDURE — 82103 ALPHA-1-ANTITRYPSIN TOTAL: CPT

## 2020-11-04 PROCEDURE — 83690 ASSAY OF LIPASE: CPT

## 2020-11-04 PROCEDURE — 84450 TRANSFERASE (AST) (SGOT): CPT

## 2020-11-04 PROCEDURE — 83516 IMMUNOASSAY NONANTIBODY: CPT

## 2020-11-04 RX ORDER — DEXTROAMPHETAMINE SACCHARATE, AMPHETAMINE ASPARTATE, DEXTROAMPHETAMINE SULFATE AND AMPHETAMINE SULFATE 7.5; 7.5; 7.5; 7.5 MG/1; MG/1; MG/1; MG/1
30 TABLET ORAL 2 TIMES DAILY
Status: DISCONTINUED | OUTPATIENT
Start: 2020-11-05 | End: 2020-11-05

## 2020-11-04 RX ORDER — OXYCODONE AND ACETAMINOPHEN 7.5; 325 MG/1; MG/1
1 TABLET ORAL EVERY 4 HOURS PRN
Status: DISCONTINUED | OUTPATIENT
Start: 2020-11-04 | End: 2020-11-05 | Stop reason: HOSPADM

## 2020-11-04 RX ORDER — HYDROCODONE BITARTRATE AND ACETAMINOPHEN 5; 325 MG/1; MG/1
1 TABLET ORAL ONCE
Status: COMPLETED | OUTPATIENT
Start: 2020-11-04 | End: 2020-11-04

## 2020-11-04 RX ORDER — SODIUM CHLORIDE 0.9 % (FLUSH) 0.9 %
10 SYRINGE (ML) INJECTION 2 TIMES DAILY
Status: DISCONTINUED | OUTPATIENT
Start: 2020-11-04 | End: 2020-11-05 | Stop reason: HOSPADM

## 2020-11-04 RX ORDER — CYCLOBENZAPRINE HCL 10 MG
10 TABLET ORAL 3 TIMES DAILY PRN
Status: DISCONTINUED | OUTPATIENT
Start: 2020-11-04 | End: 2020-11-04

## 2020-11-04 RX ORDER — SODIUM CHLORIDE 9 MG/ML
INJECTION, SOLUTION INTRAVENOUS CONTINUOUS
Status: DISCONTINUED | OUTPATIENT
Start: 2020-11-04 | End: 2020-11-05 | Stop reason: HOSPADM

## 2020-11-04 RX ORDER — NICOTINE 21 MG/24HR
1 PATCH, TRANSDERMAL 24 HOURS TRANSDERMAL DAILY
Status: DISCONTINUED | OUTPATIENT
Start: 2020-11-04 | End: 2020-11-05 | Stop reason: HOSPADM

## 2020-11-04 RX ORDER — OXYCODONE HYDROCHLORIDE AND ACETAMINOPHEN 5; 325 MG/1; MG/1
1 TABLET ORAL EVERY 4 HOURS PRN
Status: DISCONTINUED | OUTPATIENT
Start: 2020-11-04 | End: 2020-11-04

## 2020-11-04 RX ORDER — DIAZEPAM 5 MG/1
10 TABLET ORAL EVERY 12 HOURS PRN
Status: DISCONTINUED | OUTPATIENT
Start: 2020-11-04 | End: 2020-11-05 | Stop reason: HOSPADM

## 2020-11-04 RX ORDER — ONDANSETRON 2 MG/ML
4 INJECTION INTRAMUSCULAR; INTRAVENOUS EVERY 6 HOURS PRN
Status: DISCONTINUED | OUTPATIENT
Start: 2020-11-04 | End: 2020-11-05 | Stop reason: HOSPADM

## 2020-11-04 RX ORDER — DEXTROAMPHETAMINE SACCHARATE, AMPHETAMINE ASPARTATE, DEXTROAMPHETAMINE SULFATE AND AMPHETAMINE SULFATE 7.5; 7.5; 7.5; 7.5 MG/1; MG/1; MG/1; MG/1
30 TABLET ORAL 2 TIMES DAILY
Status: DISCONTINUED | OUTPATIENT
Start: 2020-11-04 | End: 2020-11-04

## 2020-11-04 RX ADMIN — OXYCODONE HYDROCHLORIDE AND ACETAMINOPHEN 1 TABLET: 5; 325 TABLET ORAL at 06:56

## 2020-11-04 RX ADMIN — DIAZEPAM 10 MG: 5 TABLET ORAL at 08:37

## 2020-11-04 RX ADMIN — DEXTROAMPHETAMINE SACCHARATE, AMPHETAMINE ASPARTATE, DEXTROAMPHETAMINE SULFATE AND AMPHETAMINE SULFATE 30 MG: 7.5; 7.5; 7.5; 7.5 TABLET ORAL at 16:48

## 2020-11-04 RX ADMIN — IOPAMIDOL 75 ML: 755 INJECTION, SOLUTION INTRAVENOUS at 01:37

## 2020-11-04 RX ADMIN — SODIUM CHLORIDE: 9 INJECTION, SOLUTION INTRAVENOUS at 03:52

## 2020-11-04 RX ADMIN — ENOXAPARIN SODIUM 40 MG: 40 INJECTION SUBCUTANEOUS at 08:37

## 2020-11-04 RX ADMIN — SODIUM CHLORIDE: 9 INJECTION, SOLUTION INTRAVENOUS at 14:33

## 2020-11-04 RX ADMIN — SODIUM CHLORIDE: 9 INJECTION, SOLUTION INTRAVENOUS at 21:11

## 2020-11-04 RX ADMIN — DIAZEPAM 10 MG: 5 TABLET ORAL at 21:11

## 2020-11-04 RX ADMIN — OXYCODONE HYDROCHLORIDE AND ACETAMINOPHEN 1 TABLET: 5; 325 TABLET ORAL at 16:48

## 2020-11-04 RX ADMIN — HYDROCODONE BITARTRATE AND ACETAMINOPHEN 1 TABLET: 5; 325 TABLET ORAL at 00:37

## 2020-11-04 RX ADMIN — OXYCODONE HYDROCHLORIDE AND ACETAMINOPHEN 1 TABLET: 5; 325 TABLET ORAL at 12:16

## 2020-11-04 RX ADMIN — SODIUM CHLORIDE, PRESERVATIVE FREE 10 ML: 5 INJECTION INTRAVENOUS at 03:59

## 2020-11-04 RX ADMIN — SODIUM CHLORIDE 10 MG: 9 INJECTION, SOLUTION INTRAVENOUS at 03:52

## 2020-11-04 RX ADMIN — OXYCODONE HYDROCHLORIDE AND ACETAMINOPHEN 1 TABLET: 7.5; 325 TABLET ORAL at 21:12

## 2020-11-04 ASSESSMENT — PAIN DESCRIPTION - ORIENTATION
ORIENTATION: RIGHT

## 2020-11-04 ASSESSMENT — PAIN DESCRIPTION - PAIN TYPE
TYPE: ACUTE PAIN

## 2020-11-04 ASSESSMENT — PAIN DESCRIPTION - DESCRIPTORS
DESCRIPTORS: SHARP;CONSTANT
DESCRIPTORS: ACHING;SHARP
DESCRIPTORS: SHARP
DESCRIPTORS: ACHING

## 2020-11-04 ASSESSMENT — PAIN SCALES - GENERAL
PAINLEVEL_OUTOF10: 8
PAINLEVEL_OUTOF10: 9
PAINLEVEL_OUTOF10: 6
PAINLEVEL_OUTOF10: 8

## 2020-11-04 ASSESSMENT — PAIN DESCRIPTION - LOCATION
LOCATION: BACK
LOCATION: ABDOMEN

## 2020-11-04 ASSESSMENT — PAIN DESCRIPTION - FREQUENCY
FREQUENCY: CONTINUOUS

## 2020-11-04 ASSESSMENT — PAIN DESCRIPTION - ONSET: ONSET: ON-GOING

## 2020-11-04 ASSESSMENT — PAIN DESCRIPTION - PROGRESSION: CLINICAL_PROGRESSION: GRADUALLY IMPROVING

## 2020-11-04 NOTE — H&P
History and Physical      Name:  Vandy Ormond /Age/Sex: 1985  (37 y.o. male)   MRN & CSN:  [id number] & [id number] Admission Date/Time: 11/3/2020  9:54 PM   Location:  [place] [address] PCP: No primary care provider on file. Hospital Day: 2    Assessment and Plan:   Vandy Ormond is a 37 y.o.  male  who presents with RLQ pain    1) RLQ pain   -Might be due to nephrolithiasis  -CT A/P: Bilateral Nephrolithiasis  -Continue pain control and IVF hydration    2) Elevated Transaminases and T Paddy  -CT A/P: Severe hepatic steatosis  -Acute Hepatitis panel negative  -No IVDU  -Will cycle enzymes  -GI consulted to establish care    Other Chronic medical conditions; medication resumed unless contraindicated  -ADHD  -Anxiety Disorder        Diet No diet orders on file   DVT Prophylaxis [] Lovenox, []  Heparin, [] SCDs, [] Ambulation   GI Prophylaxis [] PPI,  [] H2 Blocker,  [] Carafate,  [] Diet/Tube Feeds   Code Status Prior   Disposition Patient requires continued admission due to RLQ  pain   MDM [] Low, [x] Moderate,[]  High  Patient's risk as above due to RLQ pain     History of Present Illness:     Chief Complaint: <principal problem not specified>  Vandy Ormond is a 37 y.o.  male  who presents with RLQ pain that started couple of days ago. Described pain as sharp and crampy around the Rt flank. Denied any fever, chills but reported some nausea and non bilious vomiting. No IVDU or recreational drug use       Ten point ROS reviewed negative, unless as noted above    Objective:   No intake or output data in the 24 hours ending 20 0249   Vitals:   Vitals:    20 0133   BP: (!) 160/102   Pulse: 87   Resp: 13   Temp:    SpO2: 96%     Physical Exam:   GEN Awake male, sitting upright in bed in no apparent distress. Appears given age. EYES Pupils are equally round. No scleral erythema, discharge, or conjunctivitis.   HENT Mucous membranes are moist. Oral pharynx without exudates, no evidence of thrush. NECK Supple, no apparent thyromegaly or masses. RESP Clear to auscultation, no wheezes, rales or rhonchi. Symmetric chest movement while on room air. CARDIO/VASC S1/S2 auscultated. Regular rate without appreciable murmurs, rubs, or gallops. No JVD or carotid bruits. Peripheral pulses equal bilaterally and palpable. No peripheral edema. GI Abdomen is soft without significant tenderness, masses, or guarding. Bowel sounds are normoactive. Rectal exam deferred.  No costovertebral angle tenderness. Normal appearing external genitalia. Martines catheter is not present. HEME/LYMPH No palpable cervical lymphadenopathy and no hepatosplenomegaly. No petechiae or ecchymoses. MSK No gross joint deformities. SKIN Normal coloration, warm, dry. NEURO Cranial nerves appear grossly intact, normal speech, no lateralizing weakness. PSYCH Awake, alert, oriented x 4. Affect appropriate. Past Medical History:      Past Medical History:   Diagnosis Date    Kidney stone      PSHX:Reviewed but not contributory  Allergies: Allergies   Allergen Reactions    Pcn [Penicillins]        FAM HX: Reviewed but not contributory  Soc HX:   Social History     Socioeconomic History    Marital status: Single     Spouse name: None    Number of children: None    Years of education: None    Highest education level: None   Occupational History    None   Social Needs    Financial resource strain: None    Food insecurity     Worry: None     Inability: None    Transportation needs     Medical: None     Non-medical: None   Tobacco Use    Smoking status: Current Every Day Smoker     Packs/day: 1.00     Years: 10.00     Pack years: 10.00     Types: Cigarettes    Smokeless tobacco: Former User   Substance and Sexual Activity    Alcohol use:  Yes     Alcohol/week: 28.0 standard drinks     Types: 28 Shots of liquor per week     Comment: occasionally    Drug use: Not Currently    Sexual activity: Yes   Lifestyle    Physical activity     Days per week: None     Minutes per session: None    Stress: None   Relationships    Social connections     Talks on phone: None     Gets together: None     Attends [employer] service: None     Active member of club or organization: None     Attends meetings of clubs or organizations: None     Relationship status: None    Intimate partner violence     Fear of current or ex partner: None     Emotionally abused: None     Physically abused: None     Forced sexual activity: None   Other Topics Concern    None   Social History Narrative    None       Medications:   Medications:    sodium chloride flush  10 mL Intravenous BID      Infusions:   PRN Meds:        Electronically signed by Faviola Slaughter MD on 11/4/2020 at 2:49 AM

## 2020-11-04 NOTE — ED PROVIDER NOTES
Triage Chief Complaint:   Abdominal Pain (right); Back Pain (right, hx kidney failure); and Emesis    [place]:  Shaun Garcia is a 37 y.o. male that presents with complaints of 2 days of muscle fatigue and soreness with cramping associated with nausea and nonbilious nonbloody emesis. States that his urine has been dark. Also states that he has some chest tightness. States that he works at [employer] and walks about 18 miles per shift. States that he tries to stay hydrated. States that this feels similar to prior hospitalization for rhabdomyolysis. Denies any fevers, cough or shortness of breath. ROS:  At least 14 systems reviewed and otherwise acutely negative except as in the [address] Past Medical History:   Diagnosis Date    Kidney stone      History reviewed. No pertinent surgical history. History reviewed. No pertinent family history. Social History     Socioeconomic History    Marital status: Single     Spouse name: Not on file    Number of children: Not on file    Years of education: Not on file    Highest education level: Not on file   Occupational History    Not on file   Social Needs    Financial resource strain: Not on file    Food insecurity     Worry: Not on file     Inability: Not on file    Transportation needs     Medical: Not on file     Non-medical: Not on file   Tobacco Use    Smoking status: Current Every Day Smoker     Packs/day: 1.00     Years: 10.00     Pack years: 10.00     Types: Cigarettes    Smokeless tobacco: Former User   Substance and Sexual Activity    Alcohol use:  Yes     Alcohol/week: 28.0 standard drinks     Types: 28 Shots of liquor per week     Comment: occasionally    Drug use: Not Currently    Sexual activity: Yes   Lifestyle    Physical activity     Days per week: Not on file     Minutes per session: Not on file    Stress: Not on file   Relationships    Social connections     Talks on phone: Not on file     Gets together: Not on file     Attends Zoroastrian service: Not on file     Active member of club or organization: Not on file     Attends meetings of clubs or organizations: Not on file     Relationship status: Not on file    Intimate partner violence     Fear of current or ex partner: Not on file     Emotionally abused: Not on file     Physically abused: Not on file     Forced sexual activity: Not on file   Other Topics Concern    Not on file   Social History Narrative    Not on file     Current Facility-Administered Medications   Medication Dose Route Frequency Provider Last Rate Last Dose    sodium chloride flush 0.9 % injection 10 mL  10 mL Intravenous BID Yajaira Hutchins MD         Current Outpatient Medications   Medication Sig Dispense Refill    amphetamine-dextroamphetamine (ADDERALL) 30 MG tablet Take 30 mg by mouth 2 times daily.  diazePAM (VALIUM) 10 MG tablet Take 10 mg by mouth every 12 hours as needed for Anxiety. Allergies   Allergen Reactions    Pcn [Penicillins]        Nursing Notes Reviewed    Physical Exam:  ED Triage Vitals [11/03/20 1849]   Enc Vitals Group      BP (!) 133/90      Pulse 115      Resp 18      Temp 98.2 °F (36.8 °C)      Temp Source Oral      SpO2 98 %      Weight 190 lb (86.2 kg)      Height 6' 2\" (1.88 m)      Head Circumference       Peak Flow       Pain Score       Pain Loc       Pain Edu? Excl. in [address]? GENERAL APPEARANCE: Awake and alert. Cooperative. No acute distress. HEAD: Normocephalic. Atraumatic. EYES: EOM's grossly intact. Sclera anicteric. ENT: Mucous membranes are moist. Tolerates saliva. No trismus. NECK: Supple. Trachea midline. HEART: RRR. Radial pulses 2+. LUNGS: Respirations unlabored. CTAB  ABDOMEN: Soft. Non-tender. No guarding or rebound. EXTREMITIES: No acute deformities. SKIN: Warm and dry. NEUROLOGICAL: No gross facial drooping. Moves all 4 extremities spontaneously. PSYCHIATRIC: Normal mood.     I have reviewed and interpreted all of the currently available lab results from this visit (if applicable):  Results for orders placed or performed during the hospital encounter of 11/03/20   CBC Auto Differential   Result Value Ref Range    WBC 8.4 4.0 - 10.5 K/CU MM    RBC 5.06 4.6 - 6.2 M/CU MM    Hemoglobin 17.1 13.5 - 18.0 GM/DL    Hematocrit 48.6 42 - 52 %    MCV 96.0 78 - 100 FL    MCH 33.8 (H) 27 - 31 PG    MCHC 35.2 32.0 - 36.0 %    RDW 12.8 11.7 - 14.9 %    Platelets 151 669 - 470 K/CU MM    MPV 11.4 (H) 7.5 - 11.1 FL    Differential Type AUTOMATED DIFFERENTIAL     Segs Relative 67.3 (H) 36 - 66 %    Lymphocytes % 17.2 (L) 24 - 44 %    Monocytes % 14.9 (H) 0 - 4 %    Eosinophils % 0.2 0 - 3 %    Basophils % 0.2 0 - 1 %    Segs Absolute 5.6 K/CU MM    Lymphocytes Absolute 1.4 K/CU MM    Monocytes Absolute 1.3 K/CU MM    Eosinophils Absolute 0.0 K/CU MM    Basophils Absolute 0.0 K/CU MM    Nucleated RBC % 0.0 %    Total Nucleated RBC 0.0 K/CU MM    Total Immature Neutrophil 0.02 K/CU MM    Immature Neutrophil % 0.2 0 - 0.43 %   Comprehensive Metabolic Panel w/ Reflex to MG   Result Value Ref Range    Sodium 131 (L) 135 - 145 MMOL/L    Potassium 3.6 3.5 - 5.1 MMOL/L    Chloride 94 (L) 99 - 110 mMol/L    CO2 21 21 - 32 MMOL/L    BUN 4 (L) 6 - 23 MG/DL    CREATININE 0.9 0.9 - 1.3 MG/DL    Glucose 103 (H) 70 - 99 MG/DL    Calcium 9.6 8.3 - 10.6 MG/DL    Alb 4.6 3.4 - 5.0 GM/DL    Total Protein 7.9 6.4 - 8.2 GM/DL    Total Bilirubin 2.2 (H) 0.0 - 1.0 MG/DL     (H) 10 - 40 U/L     (H) 15 - 37 IU/L    Alkaline Phosphatase 145 (H) 40 - 129 IU/L    GFR Non-African American >60 >60 mL/min/1.73m2    GFR African American >60 >60 mL/min/1.73m2    Anion Gap 16 4 - 16   Urinalysis with Microscopic   Result Value Ref Range    Color, UA JEREMIAH (A) YELLOW    Clarity, UA CLEAR CLEAR    Glucose, Urine NEGATIVE NEGATIVE MG/DL    Bilirubin Urine NEGATIVE NEGATIVE MG/DL    Ketones, Urine SMALL (A) NEGATIVE MG/DL    Specific Gravity, UA 1.011 1.001 - 1.035    Blood, Urine SMALL (A) NEGATIVE    pH, Urine 6.0 5.0 - 8.0    Protein, UA 30 (A) NEGATIVE MG/DL    Urobilinogen, Urine 2.0 (H) 0.2 - 1.0 MG/DL    Nitrite Urine, Quantitative NEGATIVE NEGATIVE    Leukocyte Esterase, Urine SMALL (A) NEGATIVE    RBC, UA 4 (H) 0 - 3 /HPF    WBC, UA 7 (H) 0 - 2 /HPF    Bacteria, UA NEGATIVE NEGATIVE /HPF    Squam Epithel, UA <1 /HPF    Mucus, UA FEW (A) NEGATIVE HPF    Trichomonas, UA NONE SEEN NONE SEEN /HPF   Troponin   Result Value Ref Range    Troponin T <0.010 <0.01 NG/ML   Ethanol   Result Value Ref Range    Alcohol Scrn <0.01 <0.01 %WT/VOL   CK   Result Value Ref Range    Total CK 88 38 - 174 IU/L   Hepatitis Panel, Acute   Result Value Ref Range    Hepatitis B Surface Ag NON REACTIVE NON REACTIVE    Hep A IgM NON REACTIVE NON REACTIVE    Hep B Core Ab, IgM NON REACTIVE NON REACTIVE    Hepatitis C Ab NON REACTIVE NON REACTIVE      Radiographs (if obtained):  [] The following radiograph was interpreted by myself in the absence of a radiologist:  [] Radiologist's Report Reviewed:    EKG (if obtained): (All EKG's are interpreted by myself in the absence of a cardiologist)  12 lead EKG as interpreted by me reveals normal sinus rhythm. Axis is normal. LVH. There are no ischemic ST elevations or other suspicious ST changes;  QRS interval is narrow, QT interval is not prolonged. Final interpretation: Normal sinus rhythm. LVH      MDM:  Plan of care is discussed thoroughly with the patient and family if present. If performed, all imaging and lab work also discussed with patient. All relevant prior results and chart reviewed if available. Patient presents as above. He is in no acute distress and has appropriate vital signs here. He is started on IV fluids given Tylenol and Zofran for symptoms. Overall presentation most consistent with recurrent rhabdomyolysis. EKG does not show any dynamic EKG changes.   The patient has a benign abdominal exam.      Patient CK level is normal here and he has normal renal function. However, the patient does have some hyperbilirubinemia and transaminitis. Patient is complaining of some abdominal pain in the right upper quadrant on reevaluation and has mild tenderness there. Acute hepatitis panel is sent and right upper quadrant ultrasound ordered for further evaluation. Ultrasound is largely unremarkable. Hepatitis panel is negative. CT does not show any evidence of acute abnormality except for hepatic steatosis. Patient continues to have right upper quadrant pain on exam.  Given persistent pain, transaminitis with hyperbilirubinemia, plan for admission for further work-up. Patient does not have PCP or reliable outpatient follow-up. Clinical Impression:  1. Hyperbilirubinemia    2.  Transaminitis      (Please note that portions of this note may have been completed with a voice recognition program. Efforts were made to edit the dictations but occasionally words are mis-transcribed.)    MD Lillie Tabares MD  11/04/20 4896

## 2020-11-04 NOTE — CARE COORDINATION
Received referral from Peggy Johansen. Patient here due to work. Has insurance but only good in [place]. May need help with medications at discharge. Due to patient not being a legal [place] resident he will have limited assistance. Following for needs.

## 2020-11-04 NOTE — ED NOTES
Pt c/o chest pain upon entering room. EKG obtained at this time. Pt states he he is having generalized body aches/fatigue.  Pt states symptoms have been ongoing for weeks now      Christin Kramer RN  11/03/20 5100

## 2020-11-04 NOTE — CONSULTS
family history. No family history of colon cancer, Crohn's disease, or ulcerative colitis. REVIEW OF SYSTEMS:    The positive ROS will be identified in bold, otherwise ROS are negative     CONSTITUTIONAL:  Neg   Recent weight changes, fatigue, fever, chills or night sweats  MOUTH/THROAT:  Neg  bleeding gums, hoarseness or sore throat. RESPIRATORY:   Neg SOB, wheeze, cough, sputum, hemoptysis or bronchitis  CARDIOVASCULAR:  Neg chest pain, palpitations, dyspnea on exertion, orthopnea, paroxysmal nocturnal dyspnea or edema  GASTROINTESTINAL:  SEE HPI  HEMATOLOGIC/LYMPHATIC:  Neg  Anemia, bleeding tendency  MUSCULOSKELETAL:    New myalgias, bone pain, joint pain, swelling or stiffness and has had change in gait. NEUROLOGICAL:  Neg  Loss of Consciousness, memory loss, forgetfulness, periods of confusion, difficulty concentrating, seizures, decline in intellect, nervousness, insomina, aphasia or dysarthria. SKIN:  Neg  skin or hair changes, and has no itching, rashes, or sores. PSYCHIATRIC:  Neg depression, personality changes, anxiety. ENDOCRINE:  Neg polydipsia, polyuria, abnormal weight changes, heat /cold intolerance.   ALL/IMM:  Neg reactions to drugs other than listed    PHYSICAL EXAM:      Vitals:    BP (!) 139/95   Pulse 87   Temp 98.7 °F (37.1 °C) (Oral)   Resp 17   Ht 6' 2\" (1.88 m)   Wt 187 lb 8 oz (85 kg)   SpO2 97%   BMI 24.07 kg/m²     General Appearance:    Alert, cooperative, no distress, appears stated age, paranoid, fidgety    HEENT:    Normocephalic, atraumatic, Conjunctiva clear   Neck:   Supple, symmetrical, trachea midline   Lungs:     Clear to auscultation bilaterally, respirations unlabored   Chest Wall:    No tenderness or deformity    Heart:    Regular rate and rhythm, S1 and S2 normal   Abdomen:     Soft, R side tenderness, bowel sounds active all four quadrants,     no masses, no organomegaly, no ascites    Rectal:    Deferred   Extremities:   Extremities normal, atraumatic, Discussed plan of care with patient    Patient clinical, biochemical, and radiological information discussed with Dr. Fritz Ramirez. He agrees with the assessment and plan. Vicenta Villagomez CNP  11/4/2020  9:02 AM     abd soft  Agree with LIver work up  advissed to stop all alcohol    I have seen and examined this patient personally, and independently of the nurse practitioner. The plan was developed mutually at the time of the visit with the patient. Vicenta Villagomez and myself have spoken with patient, nursing staff and provided written and verbal instructions .     The above note has been reviewed and I agree with the Assessment,  Diagnosis, and Treatment plan as suggested by Vicenta Villagomez CNP      [facility] gastroenterology

## 2020-11-04 NOTE — PROGRESS NOTES
Comprehensive Nutrition Assessment    Type and Reason for Visit:  Initial, Positive Nutrition Screen(wt loss, poor intake)    Nutrition Recommendations/Plan:   · Continue General Diet  · Begin standard high josafat oral nutrition supplement bid to optimize intake dos, between meals if desired    Nutrition Assessment:  Pt admitted with LQ pain, likely nephrolithiasis, h/o stones. Currently feeding self and consuming 76% to all of meals on General Diet. Pt is n/a during room visit. Malnutrition Assessment:  Malnutrition Status: At risk for malnutrition   Context:  Acute Illness       Estimated Daily Nutrient Needs:  Energy (kcal):  7517-2064 ([facility]); Weight Used for Energy Requirements:  Current     Protein (g):   (1-1.2 g/kg IBW); Weight Used for Protein Requirements:  Ideal        Fluid (ml/day):  4813-8053 (1 ml/kcal); Method Used for Fluid Requirements:  1 ml/kcal      Nutrition Related Findings:  elevated liver enzymes, GI consult pending      Wounds:  None       Current Nutrition Therapies:    DIET GENERAL    Anthropometric Measures:  · Height: 6' 2\" (188 cm)  · Current Body Weight: 187 lb 8 oz (85 kg)   · Admission Body Weight: 187 lb 8 oz (85 kg)    · Usual Body Weight: 196 lb 12.8 oz (89.3 kg)(9/14/20)     · Ideal Body Weight: 190 lbs; % Ideal Body Weight 98.7 %   · BMI: 24.1  · BMI Categories: Normal Weight (BMI 18.5-24. 9)       Nutrition Diagnosis:   · Unintended weight loss related to pain, inadequate protein-energy intake as evidenced by weight loss(5% over past month)    Nutrition Interventions:   Food and/or Nutrient Delivery:  Continue Current Diet, Start Oral Nutrition Supplement  Nutrition Education/Counseling:  Education not indicated   Coordination of Nutrition Care:  Continue to monitor while inpatient    Goals:  Pt will continue to consume greater than 75% of his meals       Nutrition Monitoring and Evaluation:   Food/Nutrient Intake Outcomes:  Food and Nutrient Intake,

## 2020-11-04 NOTE — CARE COORDINATION
Reviewed chart and spoke with pt about discharge needs/plans. Pt presently lives in [facility] in [facility] area, but his home is [place], he is here working for [employer], setting up new place in [place]. He has been on short term disability since early Sept. So he is struggling with bills. He plans on returning to [place] in Jan. 2021. He is independent with ADL's and transportation. He has no one in [facility] that can help him if needed. Does not have a PCP and insurance only covers things in [place]. Gave him a 211 information, PCP list and food pantry info. Also called referral to Yuliet Prieto at [facility].  Pt denies any other needs at this time.

## 2020-11-04 NOTE — PROGRESS NOTES
After ok'ing it with Dr. Deni Escobedo and nurse manager, Aguila Lyons, patient was wheeled to his car by this RN to obtain his home med Adderall, that is not available here. Patient returned to room. Med taken to pharmacy to verify and they will deliver to unit later.

## 2020-11-04 NOTE — PROGRESS NOTES
[address]    Hospitalist Progress Note      Name:  Luz Sethi /Age/Sex: 1985  (37 y.o. male)   MRN & CSN:  [id number] & [id number] Admission Date/Time: 11/3/2020  9:54 PM   Location:  [address] [place] [address] PCP: No primary care provider on file. Hospital Day: 2    Assessment and Plan:   Luz Sethi is a 37 y.o.  male  who presents with abdominal pain     Colicky right LQ pain     Likely due to nephrolithiasis, colicky in nature, hx of stones  CT A/P: Bilateral Nephrolithiasis  Continue pain control and IVF hydration  Consult Urology      Elevated Transaminases and and Paddy    CT A/P: Severe hepatic steatosis  Acute Hepatitis panel negative  No IVDU, total CK and lipase normal   check LFT in am   GI consulted       Chronic medical conditions: medication resumed unless contraindicated    ADHD  Anxiety Disorder      Diet DIET GENERAL;   DVT Prophylaxis [] Lovenox, []  Heparin, [] SCDs, []No VTE prophylaxis, patient ambulating   GI Prophylaxis [] PPI, [] H2 Blocker, [] No GI prophylaxis, patient is receiving diet/Tube Feeds   Code Status Full Code   Disposition Patient requires continued admission due to abdominal pain    MDM [] Low, [x] Moderate,[]  High     History of Present Illness: Subjective     Patient Seen & Examined at the bedside      Patient continues to have right lower quadrant colicky abdominal pain, no change in the color of urine  Denies any fever or nausea or vomiting    Ten point ROS reviewed negative, unless as noted above    Objective: Intake/Output Summary (Last 24 hours) at 2020 1301  Last data filed at 2020 0929  Gross per 24 hour   Intake 641 ml   Output --   Net 641 ml      Vitals:   Vitals:    20 0817   BP: (!) 139/95   Pulse: 87   Resp: 17   Temp: 98.7 °F (37.1 °C)   SpO2: 97%     Physical Exam:    GEN Awake male, resting in bed in no apparent distress. Appears given age.   HENT Mucous membranes are moist.   RESP Clear to auscultation, no wheezes, rales or rhonchi. CARDIO/VASC -S1/S2 auscultated. Regular rate without appreciable murmurs, rubs, or gallops. Peripheral pulses equal bilaterally and palpable. No peripheral edema. GI Abdomen is soft without significant tenderness, masses, or guarding. Bowel sounds are normoactive. Rectal exam deferred.  Martines catheter is not present.     Medications:   Medications:    sodium chloride flush  10 mL Intravenous BID    amphetamine-dextroamphetamine  30 mg Oral BID    enoxaparin  40 mg Subcutaneous Daily    influenza virus vaccine  0.5 mL Intramuscular Prior to discharge      Infusions:    sodium chloride 125 mL/hr at 11/04/20 1220     PRN Meds: diazePAM, 10 mg, Q12H PRN  oxyCODONE-acetaminophen, 1 tablet, Q4H PRN  ondansetron, 4 mg, Q6H PRN  labetalol (TRANDATE) IVPB, 10 mg, Q4H PRN          Electronically signed by Molina Michelle MD on 11/4/2020 at 1:01 PM

## 2020-11-05 ENCOUNTER — APPOINTMENT (OUTPATIENT)
Dept: GENERAL RADIOLOGY | Age: 35
DRG: 694 | End: 2020-11-05
Payer: COMMERCIAL

## 2020-11-05 VITALS
TEMPERATURE: 98.4 F | HEART RATE: 99 BPM | RESPIRATION RATE: 18 BRPM | OXYGEN SATURATION: 99 % | WEIGHT: 191.5 LBS | HEIGHT: 74 IN | BODY MASS INDEX: 24.58 KG/M2 | SYSTOLIC BLOOD PRESSURE: 158 MMHG | DIASTOLIC BLOOD PRESSURE: 107 MMHG

## 2020-11-05 LAB
ALBUMIN SERPL-MCNC: 4 GM/DL (ref 3.4–5)
ALP BLD-CCNC: 104 IU/L (ref 40–128)
ALT SERPL-CCNC: 76 U/L (ref 10–40)
ANION GAP SERPL CALCULATED.3IONS-SCNC: 8 MMOL/L (ref 4–16)
AST SERPL-CCNC: 57 IU/L (ref 15–37)
BILIRUB SERPL-MCNC: 1.6 MG/DL (ref 0–1)
BUN BLDV-MCNC: 4 MG/DL (ref 6–23)
CALCIUM SERPL-MCNC: 8.9 MG/DL (ref 8.3–10.6)
CHLORIDE BLD-SCNC: 105 MMOL/L (ref 99–110)
CO2: 25 MMOL/L (ref 21–32)
CREAT SERPL-MCNC: 0.8 MG/DL (ref 0.9–1.3)
GFR AFRICAN AMERICAN: >60 ML/MIN/1.73M2
GFR NON-AFRICAN AMERICAN: >60 ML/MIN/1.73M2
GLUCOSE BLD-MCNC: 95 MG/DL (ref 70–99)
POTASSIUM SERPL-SCNC: 4.1 MMOL/L (ref 3.5–5.1)
SODIUM BLD-SCNC: 138 MMOL/L (ref 135–145)
TOTAL PROTEIN: 6.3 GM/DL (ref 6.4–8.2)

## 2020-11-05 PROCEDURE — 80053 COMPREHEN METABOLIC PANEL: CPT

## 2020-11-05 PROCEDURE — 36415 COLL VENOUS BLD VENIPUNCTURE: CPT

## 2020-11-05 PROCEDURE — G0378 HOSPITAL OBSERVATION PER HR: HCPCS

## 2020-11-05 PROCEDURE — 74018 RADEX ABDOMEN 1 VIEW: CPT

## 2020-11-05 PROCEDURE — 6370000000 HC RX 637 (ALT 250 FOR IP): Performed by: NURSE PRACTITIONER

## 2020-11-05 PROCEDURE — 6370000000 HC RX 637 (ALT 250 FOR IP): Performed by: INTERNAL MEDICINE

## 2020-11-05 PROCEDURE — 6360000002 HC RX W HCPCS: Performed by: INTERNAL MEDICINE

## 2020-11-05 PROCEDURE — 96372 THER/PROPH/DIAG INJ SC/IM: CPT

## 2020-11-05 PROCEDURE — 96376 TX/PRO/DX INJ SAME DRUG ADON: CPT

## 2020-11-05 PROCEDURE — 2580000003 HC RX 258: Performed by: INTERNAL MEDICINE

## 2020-11-05 PROCEDURE — 2580000003 HC RX 258: Performed by: EMERGENCY MEDICINE

## 2020-11-05 RX ORDER — POLYETHYLENE GLYCOL 3350 17 G/17G
17 POWDER, FOR SOLUTION ORAL DAILY
Status: DISCONTINUED | OUTPATIENT
Start: 2020-11-05 | End: 2020-11-05 | Stop reason: HOSPADM

## 2020-11-05 RX ORDER — DEXTROAMPHETAMINE SACCHARATE, AMPHETAMINE ASPARTATE, DEXTROAMPHETAMINE SULFATE AND AMPHETAMINE SULFATE 7.5; 7.5; 7.5; 7.5 MG/1; MG/1; MG/1; MG/1
30 TABLET ORAL 2 TIMES DAILY
Status: DISCONTINUED | OUTPATIENT
Start: 2020-11-05 | End: 2020-11-05

## 2020-11-05 RX ORDER — DOCUSATE SODIUM 100 MG/1
100 CAPSULE, LIQUID FILLED ORAL 2 TIMES DAILY
Status: DISCONTINUED | OUTPATIENT
Start: 2020-11-05 | End: 2020-11-05 | Stop reason: HOSPADM

## 2020-11-05 RX ORDER — DEXTROAMPHETAMINE SACCHARATE, AMPHETAMINE ASPARTATE, DEXTROAMPHETAMINE SULFATE AND AMPHETAMINE SULFATE 7.5; 7.5; 7.5; 7.5 MG/1; MG/1; MG/1; MG/1
30 TABLET ORAL 2 TIMES DAILY
Status: DISCONTINUED | OUTPATIENT
Start: 2020-11-05 | End: 2020-11-05 | Stop reason: HOSPADM

## 2020-11-05 RX ADMIN — OXYCODONE HYDROCHLORIDE AND ACETAMINOPHEN 1 TABLET: 7.5; 325 TABLET ORAL at 14:04

## 2020-11-05 RX ADMIN — SODIUM CHLORIDE, PRESERVATIVE FREE 10 ML: 5 INJECTION INTRAVENOUS at 09:31

## 2020-11-05 RX ADMIN — ONDANSETRON 4 MG: 2 INJECTION INTRAMUSCULAR; INTRAVENOUS at 14:04

## 2020-11-05 RX ADMIN — DIAZEPAM 10 MG: 5 TABLET ORAL at 09:42

## 2020-11-05 RX ADMIN — OXYCODONE HYDROCHLORIDE AND ACETAMINOPHEN 1 TABLET: 7.5; 325 TABLET ORAL at 09:42

## 2020-11-05 RX ADMIN — OXYCODONE HYDROCHLORIDE AND ACETAMINOPHEN 1 TABLET: 7.5; 325 TABLET ORAL at 01:13

## 2020-11-05 RX ADMIN — ENOXAPARIN SODIUM 40 MG: 40 INJECTION SUBCUTANEOUS at 08:18

## 2020-11-05 RX ADMIN — OXYCODONE HYDROCHLORIDE AND ACETAMINOPHEN 1 TABLET: 7.5; 325 TABLET ORAL at 05:15

## 2020-11-05 RX ADMIN — SODIUM CHLORIDE: 9 INJECTION, SOLUTION INTRAVENOUS at 04:25

## 2020-11-05 RX ADMIN — POLYETHYLENE GLYCOL (3350) 17 G: 17 POWDER, FOR SOLUTION ORAL at 15:50

## 2020-11-05 RX ADMIN — DEXTROAMPHETAMINE SACCHARATE, AMPHETAMINE ASPARTATE, DEXTROAMPHETAMINE SULFATE AND AMPHETAMINE SULFATE 30 MG: 7.5; 7.5; 7.5; 7.5 TABLET ORAL at 09:30

## 2020-11-05 RX ADMIN — SODIUM CHLORIDE: 9 INJECTION, SOLUTION INTRAVENOUS at 14:03

## 2020-11-05 ASSESSMENT — PAIN SCALES - GENERAL
PAINLEVEL_OUTOF10: 0
PAINLEVEL_OUTOF10: 8
PAINLEVEL_OUTOF10: 8
PAINLEVEL_OUTOF10: 7
PAINLEVEL_OUTOF10: 9

## 2020-11-05 ASSESSMENT — PAIN DESCRIPTION - ORIENTATION
ORIENTATION: RIGHT

## 2020-11-05 ASSESSMENT — PAIN DESCRIPTION - FREQUENCY
FREQUENCY: CONTINUOUS

## 2020-11-05 ASSESSMENT — PAIN DESCRIPTION - DESCRIPTORS
DESCRIPTORS: ACHING
DESCRIPTORS: ACHING
DESCRIPTORS: SHARP;RADIATING

## 2020-11-05 ASSESSMENT — PAIN DESCRIPTION - ONSET
ONSET: ON-GOING

## 2020-11-05 ASSESSMENT — PAIN DESCRIPTION - PROGRESSION
CLINICAL_PROGRESSION: NOT CHANGED
CLINICAL_PROGRESSION: NOT CHANGED
CLINICAL_PROGRESSION: GRADUALLY IMPROVING

## 2020-11-05 ASSESSMENT — PAIN DESCRIPTION - PAIN TYPE
TYPE: ACUTE PAIN

## 2020-11-05 ASSESSMENT — PAIN DESCRIPTION - LOCATION
LOCATION: BACK

## 2020-11-05 NOTE — PROGRESS NOTES
[address]    Hospitalist Progress Note      Name:  Lisbet Pantoja /Age/Sex: 1985  (37 y.o. male)   MRN & CSN:  [id number] & [id number] Admission Date/Time: 11/3/2020  9:54 PM   Location:  [address] [place] [address] PCP: No primary care provider on file. Hospital Day: 3    Assessment and Plan:   Lisbet Pantoja is a 37 y.o.  male  who presents with abdominal pain     Colicky right LQ pain     Likely due to nephrolithiasis, colicky in nature, hx of stones  CT A/P: Bilateral Nephrolithiasis -3 mm but KUB didn't show any renal stones   Continue pain control and IVF hydration  Consult Urology - pending eval      Elevated Transaminases and and Paddy - improving     Unclear etiology   CT A/P: Severe hepatic steatosis, Acute Hepatitis panel negative  No IVDU, total CK and lipase normal   > 57, >76, PADDY 2.2 >1.6, Alk phos 145 >104  GI following       Chronic medical conditions: medication resumed unless contraindicated    ADHD  Anxiety Disorder      Diet DIET GENERAL;  Dietary Nutrition Supplements: Standard High Calorie Oral Supplement   DVT Prophylaxis [] Lovenox, []  Heparin, [] SCDs, []No VTE prophylaxis, patient ambulating   GI Prophylaxis [] PPI, [] H2 Blocker, [] No GI prophylaxis, patient is receiving diet/Tube Feeds   Code Status Full Code   Disposition Patient requires continued admission due to abdominal pain    MDM [] Low, [x] Moderate,[]  High     History of Present Illness: Subjective     Patient Seen & Examined at the bedside      Patient continues to have right lower quadrant colicky abdominal pain, no change in the color of urine  Very anxious and asking lots of what if questions - attempted to explain   Pending urology eval     Ten point ROS reviewed negative, unless as noted above    Objective:        Intake/Output Summary (Last 24 hours) at 2020 1306  Last data filed at 2020 0931  Gross per 24 hour   Intake 1490 ml   Output --   Net 1490 ml      Vitals:   Vitals:    20 0823   BP: (!) 158/90   Pulse: 82   Resp: 18   Temp: 97.9 °F (36.6 °C)   SpO2: 98%     Physical Exam:    GEN Awake male, resting in bed in no apparent distress. Appears given age. HENT Mucous membranes are moist.   RESP Clear to auscultation, no wheezes, rales or rhonchi. CARDIO/VASC -S1/S2 auscultated. Regular rate without appreciable murmurs, rubs, or gallops. Peripheral pulses equal bilaterally and palpable. No peripheral edema. GI Abdomen is soft without significant tenderness, masses, or guarding. Bowel sounds are normoactive. Rectal exam deferred.  Martines catheter is not present.     Medications:   Medications:    amphetamine-dextroamphetamine  30 mg Oral BID    sodium chloride flush  10 mL Intravenous BID    enoxaparin  40 mg Subcutaneous Daily    influenza virus vaccine  0.5 mL Intramuscular Prior to discharge    nicotine  1 patch Transdermal Daily      Infusions:    sodium chloride 125 mL/hr at 11/05/20 0425     PRN Meds: diazePAM, 10 mg, Q12H PRN  ondansetron, 4 mg, Q6H PRN  labetalol (TRANDATE) IVPB, 10 mg, Q4H PRN  oxyCODONE-acetaminophen, 1 tablet, Q4H PRN          Electronically signed by Patti Noland MD on 11/5/2020 at 1:06 PM

## 2020-11-05 NOTE — PROGRESS NOTES
Pt left AMA. Dr. Otilia Johnson aware. Charge nurse, manager, and nursing supervisor informed. Pt's home supply of adderall returned to him. Counted with Pharmacy tech 47.5 pills.

## 2020-11-05 NOTE — CONSULTS
[facility] [address] [address]   Consult Note  [facility] 1 2 3 4 5    Date: 2020   Patient: Shaun Garcia   : 1985   DOA: 11/3/2020   MRN: [id number]   ROOM#: [id number]     Reason for Consult: Right sided colicky pain,  bilateral nephrolithiasis - recurrent stones  Requesting Physician:  Dr. Loretta Knowles  Collaborating Urologist on Call at time of admission: Dr. Anna Nesbitt: Abdominal pain    History Obtained From:  patient, electronic medical record    HISTORY OF PRESENT ILLNESS:                The patient is a 37 y.o. male with significant past medical history of kidney stones who presented with right side abdominal pain. Pt states his pain has been intermittent and ongoing for >2 months. Endorses associated nausea/vomiting. He has made multiple trips to the ED for his pain. Endorses hx of kidney stones, once requiring intervention via ureteroscopy/laser lithotripsy  in [place]. Currently denies fevers/chills, gross hematuria, dysuria, or other sx. Pt with hx of back surgery and rhabdomyolysis. ED Provider's HPI 11/3/20: Shaun Garcia is a 37 y.o. male that presents with complaints of 2 days of muscle fatigue and soreness with cramping associated with nausea and nonbilious nonbloody emesis. States that his urine has been dark. Also states that he has some chest tightness. States that he works at [employer] and walks about 18 miles per shift. States that he tries to stay hydrated. States that this feels similar to prior hospitalization for rhabdomyolysis. Denies any fevers, cough or shortness of breath. Past Medical History:        Diagnosis Date    Kidney stone      Past Surgical History:    History reviewed. No pertinent surgical history.   Current Medications:   Current Facility-Administered Medications: amphetamine-dextroamphetamine (ADDERALL) tablet 30 mg, 30 mg, Oral, BID  sodium chloride flush 0.9 % injection 10 mL, 10 mL, Intravenous, BID  diazePAM (VALIUM) tablet 10 mg, 10 mg, Oral, Q12H PRN  ondansetron (ZOFRAN) injection 4 mg, 4 mg, Intravenous, Q6H PRN  enoxaparin (LOVENOX) injection 40 mg, 40 mg, Subcutaneous, Daily  0.9 % sodium chloride infusion, , Intravenous, Continuous  labetalol (NORMODYNE;TRANDATE) 10 mg in sodium chloride 0.9 % 50 mL IVPB, 10 mg, Intravenous, Q4H PRN  influenza quadrivalent split vaccine (FLUZONE;FLUARIX;FLULAVAL;AFLURIA) injection 0.5 mL, 0.5 mL, Intramuscular, Prior to discharge  nicotine (NICODERM CQ) 21 MG/24HR 1 patch, 1 patch, Transdermal, Daily  oxyCODONE-acetaminophen (PERCOCET) 7.5-325 MG per tablet 1 tablet, 1 tablet, Oral, Q4H PRN    Allergies:  Flexeril [cyclobenzaprine] and Pcn [penicillins]    Social History:   TOBACCO:   reports that he has been smoking cigarettes. He has a 10.00 pack-year smoking history. He has quit using smokeless tobacco.  ETOH:   reports current alcohol use of about 28.0 standard drinks of alcohol per week. DRUGS:   reports previous drug use. Family History:   History reviewed. No pertinent family history. REVIEW OF SYSTEMS:     CONSTITUTIONAL:  positive for  fatigue  RESPIRATORY:  negative  CARDIOVASCULAR:  negative  GASTROINTESTINAL:  positive for nausea, vomiting and abdominal pain  GENITOURINARY:  negative    PHYSICAL EXAM:      VITALS:  BP (!) 150/96   Pulse 87   Temp 97.7 °F (36.5 °C) (Oral)   Resp 16   Ht 6' 2\" (1.88 m)   Wt 191 lb 8 oz (86.9 kg)   SpO2 98%   BMI 24.59 kg/m²      TEMPERATURE:  Current - Temp: 97.7 °F (36.5 °C);  Max - Temp  Av.8 °F (36.6 °C)  Min: 97.7 °F (36.5 °C)  Max: 97.9 °F (36.6 °C)  24HR BLOOD PRESSURE RANGE:  Systolic (51GHW), XET:501 , Min:146 , FHM:415   ; Diastolic (90CKE), FTP:38, Min:90, Max:98      General appearance: alert, appears stated age, cooperative, mild distress and anxious  Head: Normocephalic, without obvious abnormality, atraumatic  Back: Right side TTP below level of 12th rib  Abdomen: Soft, non-distended, TTP in RUQ    DATA:    WBC:    Lab Results   Component Value Date    WBC 8.4 11/03/2020     Hemoglobin/Hematocrit:    Lab Results   Component Value Date    HGB 17.1 11/03/2020    HCT 48.6 11/03/2020     BMP:    Lab Results   Component Value Date     11/05/2020    K 4.1 11/05/2020     11/05/2020    CO2 25 11/05/2020    BUN 4 11/05/2020    LABALBU 4.0 11/05/2020    CREATININE 0.8 11/05/2020    CALCIUM 8.9 11/05/2020    GFRAA >60 11/05/2020    LABGLOM >60 11/05/2020     PT/INR:    Lab Results   Component Value Date    PROTIME 12.2 11/04/2020    INR 1.01 11/04/2020       Urine Culture: pending    Imaging:  Ct Abdomen Pelvis W Iv Contrast Additional Contrast? None    Result Date: 11/4/2020  EXAMINATION: CT OF THE ABDOMEN AND PELVIS WITH CONTRAST 11/4/2020 1:35 am TECHNIQUE: CT of the abdomen and pelvis was performed with the administration of intravenous contrast. Multiplanar reformatted images are provided for review. Dose modulation, iterative reconstruction, and/or weight based adjustment of the mA/kV was utilized to reduce the radiation dose to as low as reasonably achievable. COMPARISON: September 10, 2020 HISTORY: ORDERING SYSTEM PROVIDED HISTORY: ruq pain TECHNOLOGIST PROVIDED HISTORY: IV contrast only. Thank you. Additional Contrast?->None Reason for exam:->ruq pain Reason for Exam: RLQ PAIN FINDINGS: Lower Chest: Clear lung bases. Organs: Low-attenuation of the liver is compatible with steatosis. The gallbladder, spleen, pancreas, adrenal glands, and kidneys demonstrate no acute abnormality. Bilateral nonobstructing nephrolithiasis measuring up to 3 mm. Bilateral ureters are normal in course and caliber. No ureteral calculi. GI/Bowel: The stomach, small bowel, and colon are normal in course and caliber without evidence of wall thickening or obstruction. Pelvis: Normal bladder. Prostate and seminal vesicles are unremarkable. Peritoneum/Retroperitoneum: No free fluid or free air.   No pathologic lymphadenopathy. Aorta and its branches are patent and normal in course and caliber. Variant anatomy of the veins is identified with a left-sided inferior vena cava. Bones/Soft Tissues: No acute or aggressive osseous lesion. Posterior fusion hardware is identified at L5-S1. Degenerative changes of the SI joints are present, right worse than left. 1. No acute intra-abdominal abnormality. 2. Hepatic steatosis. 3. Left-sided inferior vena cava. 4. Severe hepatic steatosis. 5. Bilateral nephrolithiasis. Xr Chest Portable    Result Date: 10/21/2020  EXAMINATION: ONE XRAY VIEW OF THE CHEST 10/21/2020 4:23 pm COMPARISON: 09/02/2020 HISTORY: ORDERING SYSTEM PROVIDED HISTORY: cp TECHNOLOGIST PROVIDED HISTORY: Reason for exam:->cp Reason for Exam: CP Acuity: Acute Type of Exam: Initial Additional signs and symptoms: SOB Relevant Medical/Surgical History: none FINDINGS: Lungs are hyperexpanded. Heart mediastinal contours are stable. Lungs are grossly clear. Osseous structures are. No acute process     Ct Chest Pulmonary Embolism W Contrast    Result Date: 10/21/2020  EXAMINATION: CTA OF THE CHEST 10/21/2020 7:35 pm TECHNIQUE: CTA of the chest was performed after the administration of intravenous contrast.  Multiplanar reformatted images are provided for review. MIP images are provided for review. Dose modulation, iterative reconstruction, and/or weight based adjustment of the mA/kV was utilized to reduce the radiation dose to as low as reasonably achievable. COMPARISON: None. HISTORY: ORDERING SYSTEM PROVIDED HISTORY: chest pain TECHNOLOGIST PROVIDED HISTORY: Reason for exam:->chest pain Reason for Exam: cp spasms Acuity: Acute Type of Exam: Initial Additional signs and symptoms: chest pain Relevant Medical/Surgical History: isovue  370 80 ml FINDINGS: Pulmonary Arteries: Due to bolus timing.   There is no central filling defects the possibility of small distal pulmonary emboli cannot be totally excluded however Mediastinum: No evidence of mediastinal lymphadenopathy. The heart and pericardium demonstrate no acute abnormality. There is no acute abnormality of the thoracic aorta. No evidence for right ventricular strain. No pericardial effusion Lungs/pleura: The lungs are without acute process. No focal consolidation or pulmonary edema. No evidence of pleural effusion or pneumothorax. Upper Abdomen: Limited images of the upper abdomen are unremarkable. Soft Tissues/Bones: No acute bone or soft tissue abnormality. No evidence for acute infiltrate. .  No evidence for effusions. There is no central filling defects in the pulmonary arteries however the possibility of small distal pulmonary emboli cannot be totally excluded on this study due to poor bolus timing. There is no evidence for right ventricular strain. Us Abdomen Limited    Result Date: 11/4/2020  EXAMINATION: RIGHT UPPER QUADRANT ULTRASOUND 11/3/2020 11:47 pm COMPARISON: CT abdomen/pelvis 09/10/2020 HISTORY: ORDERING SYSTEM PROVIDED HISTORY: RUQ pain TECHNOLOGIST PROVIDED HISTORY: Reason for exam:->RUQ pain Reason for Exam: Abd pain, muscle cramps, fatigue Acuity: Chronic Type of Exam: Ongoing Additional signs and symptoms: CT in 09/2020 FINDINGS: LIVER:  There is coarse increased echogenicity of liver parenchyma most consistent with fatty infiltration. BILIARY SYSTEM:  Gallbladder is unremarkable without evidence of pericholecystic fluid, wall thickening or stones. Negative sonographic Perez's sign. Common bile duct is within normal limits measuring 5.3 mm. RIGHT KIDNEY: The right kidney is grossly unremarkable without evidence of hydronephrosis. PANCREAS:  Visualized portions of the pancreas are unremarkable. OTHER: No evidence of right upper quadrant ascites. Coarse increased echogenicity of liver parenchyma, likely fatty infiltration. No evidence of cholelithiasis.        Assessment & Plan:      Keyon Freedman is a 37y.o. year old male admitted 11/3/2020 for abnormal liver enzymes. 1) Bilateral Nephrolithiasis   CT a/p 11/4/20: Reviewed. Bilateral nonobstructing nephrolithiasis measuring up to 3 mm. Bilateral ureters are normal in course and caliber. Normal bladder. Prostate and seminal vesicles are unremarkable. KUB 11/5/20: Reviewed. Small renal stones seen on recent CT are not radiographically evident. Cr 0.8   UA dip with small leks, small blood; urine cx ordered  Very low suspicion for small, nonobstructing renal stones as cause of pt's pain. Intervention would likely not be beneficial as low likelihood of being able to visualize small right renal stone in lower pole via ureteroscopy. Stones not visualized on KUB, so patient is not a candidate for ESWL. Will continue to follow    Patient seen and examined, chart reviewed.      Electronically signed by Atilio Holley PA-C on 11/5/2020 at 8:18 AM

## 2020-11-05 NOTE — PROGRESS NOTES
Vincent KUN RN clinical  for [facility] SURGICAL [facility] RN students 07-19:00 this date.

## 2020-11-06 LAB
ALANINE AMINOTRANSFERASE, FIBROMETER: 102 U/L (ref 5–50)
ALPHA-1 ANTITRYPSIN: 147 MG/DL (ref 90–200)
ANTI-MITOCHON TITER: 3.7 UNITS (ref 0–24.9)
ANTI-NUCLEAR ANTIBODY (ANA): NORMAL
ASPARTATE AMINOTRANSFERASE, FIBROMETER: 108 U/L (ref 9–50)
CERULOPLASMIN: 20 MG/DL (ref 17–54)
EER FIBROMETER NAFL ENHANCED REPORT: ABNORMAL
F-ACTIN AB, IGG: 4 UNITS (ref 0–19)
FERRITIN, FIBROMETER: 256 NG/ML (ref 48–420)
FIBROMETER NAFLD INTERPRETATION: ABNORMAL
FIBROMETER PATIENT SCORE: 0.65
FIBROMETER PLATELET COUNT: 165
FIBROSIS METAVIR CLASSIFICATION: ABNORMAL
GLUCOSE, FIBROMETER: 89 MG/DL
WEIGHT, FIBROMETER: 188 LBS

## 2020-11-06 NOTE — CARE COORDINATION
Patient left AMA. Due to this and the fact that he is not a legal [place] resident he will not be eligible for any voucher assistance.       Patient placed on flagged list.

## 2020-11-09 ENCOUNTER — TELEPHONE (OUTPATIENT)
Dept: FAMILY MEDICINE CLINIC | Age: 35
End: 2020-11-09

## 2020-11-09 NOTE — TELEPHONE ENCOUNTER
The last time I saw this patient was in September. If he is not better he should go back to the ER. I am showing that he left AMA and had concerns about his care. If he is having concerns about his care he may feel comfortable going to another office. Since he was admitted to the hospital - left AMA and no improvement in symptoms he would not be appropriate for this clinic.

## 2021-01-01 ENCOUNTER — EMERGENCY (EMERGENCY)
Facility: HOSPITAL | Age: 36
LOS: 1 days | End: 2021-01-01
Admitting: EMERGENCY MEDICINE
Payer: MEDICAID

## 2021-01-01 PROCEDURE — 71045 X-RAY EXAM CHEST 1 VIEW: CPT | Mod: 26

## 2021-01-01 PROCEDURE — 74177 CT ABD & PELVIS W/CONTRAST: CPT | Mod: 26

## 2021-01-01 PROCEDURE — 99285 EMERGENCY DEPT VISIT HI MDM: CPT
